# Patient Record
Sex: FEMALE | Race: WHITE | NOT HISPANIC OR LATINO | Employment: OTHER | URBAN - METROPOLITAN AREA
[De-identification: names, ages, dates, MRNs, and addresses within clinical notes are randomized per-mention and may not be internally consistent; named-entity substitution may affect disease eponyms.]

---

## 2017-05-03 ENCOUNTER — HISTORICAL (OUTPATIENT)
Dept: ADMINISTRATIVE | Facility: HOSPITAL | Age: 72
End: 2017-05-03

## 2018-08-28 ENCOUNTER — OFFICE VISIT (OUTPATIENT)
Dept: OBSTETRICS AND GYNECOLOGY | Facility: CLINIC | Age: 73
End: 2018-08-28
Payer: MEDICARE

## 2018-08-28 VITALS — WEIGHT: 105.81 LBS | HEART RATE: 55 BPM | SYSTOLIC BLOOD PRESSURE: 142 MMHG | DIASTOLIC BLOOD PRESSURE: 67 MMHG

## 2018-08-28 DIAGNOSIS — Z12.39 SCREENING FOR MALIGNANT NEOPLASM OF BREAST: ICD-10-CM

## 2018-08-28 DIAGNOSIS — Z01.419 WELL WOMAN EXAM WITH ROUTINE GYNECOLOGICAL EXAM: ICD-10-CM

## 2018-08-28 DIAGNOSIS — R10.2 PELVIC PAIN: ICD-10-CM

## 2018-08-28 DIAGNOSIS — Z00.00 WELL WOMAN EXAM (NO GYNECOLOGICAL EXAM): Primary | ICD-10-CM

## 2018-08-28 DIAGNOSIS — Z01.419 ENCOUNTER FOR WELL WOMAN EXAM WITH ROUTINE GYNECOLOGICAL EXAM: ICD-10-CM

## 2018-08-28 PROCEDURE — 99204 OFFICE O/P NEW MOD 45 MIN: CPT | Mod: PBBFAC,PO | Performed by: OBSTETRICS & GYNECOLOGY

## 2018-08-28 PROCEDURE — G0101 CA SCREEN;PELVIC/BREAST EXAM: HCPCS | Mod: S$PBB,,, | Performed by: OBSTETRICS & GYNECOLOGY

## 2018-08-28 PROCEDURE — 88175 CYTOPATH C/V AUTO FLUID REDO: CPT

## 2018-08-28 PROCEDURE — 87624 HPV HI-RISK TYP POOLED RSLT: CPT

## 2018-08-28 PROCEDURE — G0101 CA SCREEN;PELVIC/BREAST EXAM: HCPCS | Mod: PBBFAC,PO | Performed by: OBSTETRICS & GYNECOLOGY

## 2018-08-28 PROCEDURE — 99999 PR PBB SHADOW E&M-NEW PATIENT-LVL IV: CPT | Mod: PBBFAC,,, | Performed by: OBSTETRICS & GYNECOLOGY

## 2018-08-28 RX ORDER — EZETIMIBE 10 MG/1
10 TABLET ORAL DAILY
COMMUNITY

## 2018-08-28 RX ORDER — ALPRAZOLAM 1 MG/1
1 TABLET ORAL 2 TIMES DAILY
COMMUNITY
End: 2019-10-29

## 2018-08-28 RX ORDER — PANTOPRAZOLE SODIUM 20 MG/1
40 TABLET, DELAYED RELEASE ORAL DAILY
COMMUNITY
End: 2022-12-11 | Stop reason: DRUGHIGH

## 2018-08-28 RX ORDER — TRAZODONE HYDROCHLORIDE 50 MG/1
50 TABLET ORAL NIGHTLY
COMMUNITY
End: 2019-10-29

## 2018-08-28 RX ORDER — LOSARTAN POTASSIUM 25 MG/1
25 TABLET ORAL DAILY
COMMUNITY
End: 2019-10-29

## 2018-08-28 RX ORDER — SERTRALINE HYDROCHLORIDE 50 MG/1
50 TABLET, FILM COATED ORAL DAILY
COMMUNITY
End: 2019-10-29

## 2018-08-28 RX ORDER — ASPIRIN 81 MG/1
81 TABLET ORAL DAILY
COMMUNITY

## 2018-08-28 NOTE — PROGRESS NOTES
Subjective:       Patient ID: Madeline Leavitt is a 72 y.o. female.    Chief Complaint:  Well Woman      History of Present Illness  HPI  Annual Exam-Postmenopausal  Patient presents for annual exam. The patient has no complaints today. The patient is not sexually active. GYN screening history: no prior history of gyn screening tests. The patient is not taking hormone replacement therapy. Patient denies post-menopausal vaginal bleeding. The patient wears seatbelts: yes. The patient participates in regular exercise: not asked. Has the patient ever been transfused or tattooed?: not asked. The patient reports that there is not domestic violence in her life.    GYN & OB History  No LMP recorded. Patient is postmenopausal. Menopause age 46  Date of Last Pap: No result found    OB History    Para Term  AB Living   3 3 3         SAB TAB Ectopic Multiple Live Births                  # Outcome Date GA Lbr Jose/2nd Weight Sex Delivery Anes PTL Lv   3 Term      Vag-Spont      2 Term      Vag-Spont      1 Term      Vag-Spont             Review of Systems  Review of Systems   Constitutional: Negative for activity change, appetite change, chills, diaphoresis, fatigue, fever and unexpected weight change.   HENT: Negative for mouth sores and tinnitus.    Eyes: Negative for discharge and visual disturbance.   Respiratory: Negative for cough, shortness of breath and wheezing.    Cardiovascular: Negative for chest pain, palpitations and leg swelling.   Gastrointestinal: Positive for constipation. Negative for abdominal pain, bloating, blood in stool, diarrhea, nausea and vomiting.   Endocrine: Negative for diabetes, hair loss, hot flashes, hyperthyroidism and hypothyroidism.   Genitourinary: Positive for urinary incontinence. Negative for decreased libido, dyspareunia, dysuria, flank pain, frequency, genital sores, hematuria, menorrhagia, menstrual problem, pelvic pain, urgency, vaginal bleeding, vaginal discharge, vaginal  pain, dysmenorrhea, postcoital bleeding, postmenopausal bleeding and vaginal odor.   Musculoskeletal: Negative for back pain, joint swelling and myalgias.   Skin:  Negative for rash, no acne and hair changes.   Neurological: Negative for seizures, syncope, numbness and headaches.   Hematological: Negative for adenopathy. Does not bruise/bleed easily.   Psychiatric/Behavioral: Negative for depression and sleep disturbance. The patient is nervous/anxious.    Breast: Negative for breast mass, breast pain, nipple discharge and skin changes          Objective:    Physical Exam:   Constitutional: She is oriented to person, place, and time. She appears well-developed and well-nourished. No distress.    HENT:   Head: Normocephalic.    Eyes: Pupils are equal, round, and reactive to light.    Neck: Normal range of motion.    Cardiovascular: Normal rate.     Pulmonary/Chest: Effort normal.   Breasts: implants noted bilaterally. No nipple discharge        Abdominal: Soft. She exhibits no distension. There is tenderness.     Genitourinary: Uterus normal. No vaginal discharge found.   Genitourinary Comments: Pap smear done of cervix. Vaginal canal is atrophic appearing. No palpable adnexal masses but patient is tender in LLQ.           Musculoskeletal: Normal range of motion and moves all extremeties.       Neurological: She is alert and oriented to person, place, and time.    Skin: Skin is warm and dry.    Psychiatric: She has a normal mood and affect. Her behavior is normal. Judgment and thought content normal.          Assessment:        1. Well woman exam (no gynecological exam)    2. Well woman exam with routine gynecological exam    3. Screening for malignant neoplasm of breast    4. Pelvic pain    5. Encounter for well woman exam with routine gynecological exam                Plan:      Pelvic ultrasound and mammogram

## 2018-08-31 LAB
HPV HR 12 DNA CVX QL NAA+PROBE: NEGATIVE
HPV16 AG SPEC QL: NEGATIVE
HPV18 DNA SPEC QL NAA+PROBE: NEGATIVE

## 2018-10-04 ENCOUNTER — HOSPITAL ENCOUNTER (OUTPATIENT)
Dept: RADIOLOGY | Facility: CLINIC | Age: 73
Discharge: HOME | End: 2018-10-04
Attending: OBSTETRICS & GYNECOLOGY
Payer: MEDICARE

## 2018-10-04 DIAGNOSIS — Z12.39 SCREENING FOR MALIGNANT NEOPLASM OF BREAST: ICD-10-CM

## 2018-10-04 DIAGNOSIS — R10.2 PELVIC PAIN: ICD-10-CM

## 2018-10-04 PROCEDURE — 77063 BREAST TOMOSYNTHESIS BI: CPT | Mod: TC,PO

## 2018-10-04 PROCEDURE — 76856 US EXAM PELVIC COMPLETE: CPT | Mod: 26,,, | Performed by: RADIOLOGY

## 2018-10-04 PROCEDURE — 77063 BREAST TOMOSYNTHESIS BI: CPT | Mod: 26,,, | Performed by: RADIOLOGY

## 2018-10-04 PROCEDURE — 76830 TRANSVAGINAL US NON-OB: CPT | Mod: 26,,, | Performed by: RADIOLOGY

## 2018-10-04 PROCEDURE — 77067 SCR MAMMO BI INCL CAD: CPT | Mod: 26,,, | Performed by: RADIOLOGY

## 2018-10-04 PROCEDURE — 76856 US EXAM PELVIC COMPLETE: CPT | Mod: TC,PO

## 2018-10-08 ENCOUNTER — TELEPHONE (OUTPATIENT)
Dept: OBSTETRICS AND GYNECOLOGY | Facility: CLINIC | Age: 73
End: 2018-10-08

## 2018-10-08 NOTE — TELEPHONE ENCOUNTER
Called pt regarding normal elham results/ pt did not answer/ and line was busy. No way to leave voicemail/ letter of normal mammo results mailed to pt.

## 2018-10-08 NOTE — TELEPHONE ENCOUNTER
----- Message from Oliver Kennedy MD sent at 10/8/2018  9:38 AM CDT -----  Please notify patient of normal mammogram.

## 2018-11-01 ENCOUNTER — OFFICE VISIT (OUTPATIENT)
Dept: OBSTETRICS AND GYNECOLOGY | Facility: CLINIC | Age: 73
End: 2018-11-01
Payer: MEDICARE

## 2018-11-01 ENCOUNTER — HOSPITAL ENCOUNTER (OUTPATIENT)
Dept: RADIOLOGY | Facility: HOSPITAL | Age: 73
Discharge: HOME | End: 2018-11-01
Attending: OBSTETRICS & GYNECOLOGY
Payer: MEDICARE

## 2018-11-01 VITALS — WEIGHT: 105 LBS

## 2018-11-01 DIAGNOSIS — R19.00 PELVIC MASS IN FEMALE: ICD-10-CM

## 2018-11-01 DIAGNOSIS — N89.8 VAGINAL ODOR: Primary | ICD-10-CM

## 2018-11-01 DIAGNOSIS — N94.89 ADNEXAL MASS: ICD-10-CM

## 2018-11-01 PROCEDURE — 99212 OFFICE O/P EST SF 10 MIN: CPT | Mod: PBBFAC,PO | Performed by: OBSTETRICS & GYNECOLOGY

## 2018-11-01 PROCEDURE — 99999 PR PBB SHADOW E&M-EST. PATIENT-LVL II: CPT | Mod: PBBFAC,,, | Performed by: OBSTETRICS & GYNECOLOGY

## 2018-11-01 PROCEDURE — A9585 GADOBUTROL INJECTION: HCPCS | Performed by: OBSTETRICS & GYNECOLOGY

## 2018-11-01 PROCEDURE — 72197 MRI PELVIS W/O & W/DYE: CPT | Mod: 26,,, | Performed by: RADIOLOGY

## 2018-11-01 PROCEDURE — 99213 OFFICE O/P EST LOW 20 MIN: CPT | Mod: S$PBB,,, | Performed by: OBSTETRICS & GYNECOLOGY

## 2018-11-01 PROCEDURE — 72197 MRI PELVIS W/O & W/DYE: CPT | Mod: TC

## 2018-11-01 PROCEDURE — 87660 TRICHOMONAS VAGIN DIR PROBE: CPT

## 2018-11-01 PROCEDURE — 25500020 PHARM REV CODE 255: Performed by: OBSTETRICS & GYNECOLOGY

## 2018-11-01 RX ORDER — GADOBUTROL 604.72 MG/ML
INJECTION INTRAVENOUS
Status: DISCONTINUED
Start: 2018-11-01 | End: 2018-11-02 | Stop reason: HOSPADM

## 2018-11-01 RX ORDER — GADOBUTROL 604.72 MG/ML
4 INJECTION INTRAVENOUS
Status: COMPLETED | OUTPATIENT
Start: 2018-11-01 | End: 2018-11-01

## 2018-11-01 RX ADMIN — GADOBUTROL 4 ML: 604.72 INJECTION INTRAVENOUS at 07:11

## 2018-11-01 NOTE — PROGRESS NOTES
Subjective:       Patient ID: Madeline Leavitt is a 73 y.o. female.    Chief Complaint:  Results      History of Present Illness  HPI  73 y.o.  in today with her  to discuss pelvic ultrasound. Scan was done due to LLQ discomfort on pelvic exam. There is noted a non vascular 2.3 cm apparently complex cystic mass in right adnexa. Patient also reports vaginal odor today.    GYN & OB History  No LMP recorded. Patient is postmenopausal.   Date of Last Pap: 2018    OB History    Para Term  AB Living   3 3 3         SAB TAB Ectopic Multiple Live Births                  # Outcome Date GA Lbr Jose/2nd Weight Sex Delivery Anes PTL Lv   3 Term      Vag-Spont      2 Term      Vag-Spont      1 Term      Vag-Spont             Review of Systems  Review of Systems   Constitutional: Negative for activity change, appetite change, chills, diaphoresis, fatigue, fever and unexpected weight change.   HENT: Negative for mouth sores and tinnitus.    Eyes: Negative for discharge and visual disturbance.   Respiratory: Negative for cough, shortness of breath and wheezing.    Cardiovascular: Negative for chest pain, palpitations and leg swelling.   Gastrointestinal: Positive for constipation. Negative for abdominal pain, bloating, blood in stool, diarrhea, nausea and vomiting.   Endocrine: Negative for diabetes, hair loss, hot flashes, hyperthyroidism and hypothyroidism.   Genitourinary: Negative for decreased libido, dyspareunia, dysuria, flank pain, frequency, genital sores, hematuria, menorrhagia, menstrual problem, pelvic pain, urgency, vaginal bleeding, vaginal discharge, vaginal pain, dysmenorrhea, urinary incontinence, postcoital bleeding, postmenopausal bleeding and vaginal odor.   Musculoskeletal: Negative for back pain, joint swelling and myalgias.   Skin:  Negative for rash, no acne and hair changes.   Neurological: Negative for seizures, syncope, numbness and headaches.   Hematological: Negative for  adenopathy. Does not bruise/bleed easily.   Psychiatric/Behavioral: Positive for depression. Negative for sleep disturbance. The patient is nervous/anxious.    Breast: Negative for breast mass, breast pain, nipple discharge and skin changes          Objective:    Physical Exam:   Constitutional: She is oriented to person, place, and time. She appears well-developed and well-nourished. No distress.    HENT:   Head: Normocephalic.    Eyes: Pupils are equal, round, and reactive to light.    Neck: Normal range of motion.    Cardiovascular: Normal rate.     Pulmonary/Chest: Effort normal.          Genitourinary: No vaginal discharge found.   Genitourinary Comments: Vaginal cultures done today           Musculoskeletal: Normal range of motion and moves all extremeties.       Neurological: She is alert and oriented to person, place, and time.    Skin: Skin is warm and dry.    Psychiatric: She has a normal mood and affect. Her behavior is normal. Judgment and thought content normal.          Assessment:        1. Vaginal odor    2. Adnexal mass    3. Pelvic mass in female                Plan:      MRI of pelvis. If suspicious for cancer of ovary,then referral to Gyn Oncology

## 2018-11-02 LAB
CANDIDA RRNA VAG QL PROBE: NEGATIVE
G VAGINALIS RRNA GENITAL QL PROBE: NEGATIVE
T VAGINALIS RRNA GENITAL QL PROBE: NEGATIVE

## 2019-09-26 ENCOUNTER — TELEPHONE (OUTPATIENT)
Dept: FAMILY MEDICINE | Facility: CLINIC | Age: 74
End: 2019-09-26

## 2019-09-26 NOTE — TELEPHONE ENCOUNTER
----- Message from Syl Kirby sent at 9/26/2019 11:28 AM CDT -----  Type: Needs Medical Advice    Who Called:  Patient  Best Call Back Number: [phone]  Additional Information: Epic will not allow me to schedule a new patient with Dr. Lora. I can schedule a established patient but not a new patient. I placed unsuccessful call to office and tried to skype. Please call with availability.

## 2019-10-29 ENCOUNTER — DOCUMENTATION ONLY (OUTPATIENT)
Dept: FAMILY MEDICINE | Facility: CLINIC | Age: 74
End: 2019-10-29

## 2019-10-29 ENCOUNTER — OFFICE VISIT (OUTPATIENT)
Dept: FAMILY MEDICINE | Facility: CLINIC | Age: 74
End: 2019-10-29
Payer: MEDICARE

## 2019-10-29 VITALS
OXYGEN SATURATION: 96 % | BODY MASS INDEX: 22.55 KG/M2 | DIASTOLIC BLOOD PRESSURE: 78 MMHG | SYSTOLIC BLOOD PRESSURE: 128 MMHG | RESPIRATION RATE: 16 BRPM | TEMPERATURE: 98 F | HEIGHT: 62 IN | WEIGHT: 122.56 LBS | HEART RATE: 41 BPM

## 2019-10-29 DIAGNOSIS — R00.1 BRADYCARDIA: ICD-10-CM

## 2019-10-29 DIAGNOSIS — F41.9 ANXIETY AND DEPRESSION: Primary | ICD-10-CM

## 2019-10-29 DIAGNOSIS — R68.89 COLD INTOLERANCE: ICD-10-CM

## 2019-10-29 DIAGNOSIS — I48.0 AF (PAROXYSMAL ATRIAL FIBRILLATION): ICD-10-CM

## 2019-10-29 DIAGNOSIS — F32.A ANXIETY AND DEPRESSION: Primary | ICD-10-CM

## 2019-10-29 DIAGNOSIS — I10 HYPERTENSION, ESSENTIAL: ICD-10-CM

## 2019-10-29 DIAGNOSIS — G47.00 INSOMNIA, UNSPECIFIED TYPE: ICD-10-CM

## 2019-10-29 DIAGNOSIS — E78.5 HYPERLIPIDEMIA, UNSPECIFIED HYPERLIPIDEMIA TYPE: ICD-10-CM

## 2019-10-29 PROCEDURE — 99204 PR OFFICE/OUTPT VISIT, NEW, LEVL IV, 45-59 MIN: ICD-10-PCS | Mod: S$GLB,,, | Performed by: INTERNAL MEDICINE

## 2019-10-29 PROCEDURE — 93005 ELECTROCARDIOGRAM TRACING: CPT | Mod: S$GLB,,, | Performed by: INTERNAL MEDICINE

## 2019-10-29 PROCEDURE — 93005 EKG 12-LEAD: ICD-10-PCS | Mod: S$GLB,,, | Performed by: INTERNAL MEDICINE

## 2019-10-29 PROCEDURE — 93010 ELECTROCARDIOGRAM REPORT: CPT | Mod: S$GLB,,, | Performed by: INTERNAL MEDICINE

## 2019-10-29 PROCEDURE — 93010 EKG 12-LEAD: ICD-10-PCS | Mod: S$GLB,,, | Performed by: INTERNAL MEDICINE

## 2019-10-29 PROCEDURE — 99204 OFFICE O/P NEW MOD 45 MIN: CPT | Mod: S$GLB,,, | Performed by: INTERNAL MEDICINE

## 2019-10-29 RX ORDER — ALPRAZOLAM 0.5 MG/1
0.5 TABLET ORAL 3 TIMES DAILY
Qty: 30 TABLET | Refills: 0 | Status: SHIPPED | OUTPATIENT
Start: 2019-10-29 | End: 2019-12-11 | Stop reason: SDUPTHER

## 2019-10-29 RX ORDER — PAROXETINE HYDROCHLORIDE 20 MG/1
20 TABLET, FILM COATED ORAL EVERY MORNING
Qty: 90 TABLET | Refills: 1 | Status: SHIPPED | OUTPATIENT
Start: 2019-10-29 | End: 2023-01-30

## 2019-10-29 RX ORDER — UBIDECARENONE 30 MG
30 CAPSULE ORAL 3 TIMES DAILY
COMMUNITY

## 2019-10-29 RX ORDER — LISINOPRIL 20 MG/1
40 TABLET ORAL DAILY
COMMUNITY
Start: 2019-10-09 | End: 2022-12-11 | Stop reason: DRUGHIGH

## 2019-10-29 RX ORDER — TRAZODONE HYDROCHLORIDE 100 MG/1
100 TABLET ORAL NIGHTLY PRN
Qty: 30 TABLET | Refills: 3 | Status: SHIPPED | OUTPATIENT
Start: 2019-10-29

## 2019-10-29 RX ORDER — FLECAINIDE ACETATE 50 MG/1
1 TABLET ORAL 3 TIMES DAILY
COMMUNITY
Start: 2019-08-14

## 2019-10-29 NOTE — PROGRESS NOTES
Subjective:      3:03 PM     Patient ID: Madeline Leavitt is a 74 y.o. female.    Chief Complaint: Establish Care and Depression    HPI   The    CHIEF COMPLAINT: Depression: yes.  . Anxiety: yes.   HPI:  She has been on Xanax for 10 years.  She has cut back to 1 at bedtime goes the trazodone does not work.     ONSET/TIMIN months    ago.  Similar problems in past: .yes.   . # of episodes  of panic per week      0. .    DURATION:  constant    QUALITY/COURSE:  Worsening     INTENSITY/SEVERITY: .Severity is # 9 (10 point scale)    CONTEXT/WHEN:  Postpartum: no..  Personal loss: no ..  Stress: yes..    MODIFIERS/TREATMENTS: . Taking medications: yes.  Compliance with taking prescribed medications: yes.  alcohol abuse: no ...  Drug abuse: no .  Chronic disease: no.      The following symptoms are positive if BOLD, negative otherwise.        SYMPTOMS/RELATED: Possible medication side effects include:  dry mouth, decreased libido, impotence, GI disturbance, headache, insomnia, weight gain and weight loss.    REVIEW OF SYSTEMS: Sadness . Weakness . Fatigue . Lack_of _enjoyment_in_life . Sleep_disturbances . Anorexia .  Increased_appetite .  Irritability . Crying_spells .  Guilt .  Lost_concentration . Suicidal_ideation .      The patient has a history of atrial fibrillation which is paroxysmal.  A month ago it lasted for 30 sec which was longer than usual.  She is on both flecainide and Eliquis for this.  When she gets the palpitation she gets a tight throat numbness in her left arm mild chest pain.      CHIEF COMPLAINT: Hypertension  HPI:     ONSET:      QUALITY/COURSE:   Unchanged.     INTENSITY/SEVERITY:  Average blood pressure is unknown.      MODIFIERS/TREATMENTS:  Taking medications: yes. .High sodium intake: no. alcohol: no      The following symptoms are positive only if BOLDED, otherwise are negative.      SYMPTOMS/RELATED: Possible medication side effects include:   Depression..  . Cough. . Constipation.     "REVIEW OF SYMPTOMS: . Weight_loss . Weight_gain . Leg_cramps ..    TARGET ORGAN DAMAGE:: angina/ prior myocardial infarction, chronic kidney disease, heart failure, left ventricular hypertrophy, peripheral artery disease, prior coronary revascularization, retinopathy, stroke. transient ischemic attack.      Review of Systems   Constitutional: Positive for fatigue. Negative for fever and unexpected weight change.   HENT: Negative for dental problem, hearing loss, nosebleeds, rhinorrhea, tinnitus, trouble swallowing and voice change.    Eyes: Negative for itching and visual disturbance.   Respiratory: Negative for cough, shortness of breath and wheezing.    Cardiovascular: Negative for chest pain and palpitations.   Gastrointestinal: Negative for abdominal pain, blood in stool, constipation, diarrhea, nausea and vomiting.   Endocrine: Positive for cold intolerance. Negative for heat intolerance, polydipsia and polyphagia.   Genitourinary: Negative for difficulty urinating and dysuria.   Musculoskeletal: Negative for arthralgias.   Allergic/Immunologic: Negative for environmental allergies and immunocompromised state.   Neurological: Negative for dizziness, seizures, weakness, numbness and headaches.   Hematological: Does not bruise/bleed easily.   Psychiatric/Behavioral: Positive for agitation, dysphoric mood and sleep disturbance. Negative for suicidal ideas. The patient is nervous/anxious.          Objective:      Vitals:    10/29/19 1457   BP: 128/78   Pulse: (!) 41   Resp: 16   Temp: 98 °F (36.7 °C)   TempSrc: Oral   SpO2: 96%   Weight: 55.6 kg (122 lb 9.2 oz)   Height: 5' 2" (1.575 m)   PainSc: 0-No pain     Physical Exam   Constitutional: She is oriented to person, place, and time. She appears well-nourished. No distress.   HENT:   Head: Normocephalic and atraumatic.   Right Ear: External ear normal.   Left Ear: External ear normal.   Mouth/Throat: Oropharynx is clear and moist. No oropharyngeal exudate.   Eyes: " Pupils are equal, round, and reactive to light. Conjunctivae and EOM are normal. No scleral icterus.   Neck: Normal range of motion. Neck supple. No thyromegaly present.   Cardiovascular: Normal rate, regular rhythm, normal heart sounds and intact distal pulses. Exam reveals no gallop and no friction rub.   No murmur heard.  Pulmonary/Chest: Effort normal and breath sounds normal. No respiratory distress. She has no wheezes. She has no rales. She exhibits no tenderness.   Abdominal: Soft. Bowel sounds are normal. She exhibits no distension. There is no tenderness.   Musculoskeletal: Normal range of motion. She exhibits no edema or tenderness.   Lymphadenopathy:     She has no cervical adenopathy.   Neurological: She is alert and oriented to person, place, and time. She displays normal reflexes. No cranial nerve deficit. She exhibits normal muscle tone. Coordination normal.   Skin: Skin is warm and dry. No rash noted.   Psychiatric: She has a normal mood and affect. Her behavior is normal. Judgment and thought content normal.   Nursing note and vitals reviewed.      EKG shows sinus bradycardia, possible previous MI  Assessment:       1. Anxiety and depression    2. Hypertension, essential    3. Cold intolerance    4. Hyperlipidemia, unspecified hyperlipidemia type    5. Insomnia, unspecified type    6. Bradycardia          Plan:       Anxiety and depression  -     paroxetine (PAXIL) 20 MG tablet; Take 1 tablet (20 mg total) by mouth every morning.  Dispense: 90 tablet; Refill: 1  -     ALPRAZolam (XANAX) 0.5 MG tablet; Take 1 tablet (0.5 mg total) by mouth 3 (three) times daily.  Dispense: 30 tablet; Refill: 0    Hypertension, essential  -     CBC auto differential; Future; Expected date: 10/29/2019  -     Comprehensive metabolic panel; Future; Expected date: 10/29/2019    Cold intolerance  -     TSH; Future; Expected date: 10/29/2019    Hyperlipidemia, unspecified hyperlipidemia type  -     Lipid panel; Future;  Expected date: 10/29/2019    Insomnia, unspecified type    Bradycardia  -     EKG 12-lead; Future    Other orders  -     traZODone (DESYREL) 100 MG tablet; Take 1 tablet (100 mg total) by mouth nightly as needed for Insomnia. Take 2 hr before bedtime  Dispense: 30 tablet; Refill: 3      Follow up in about 6 weeks (around 12/10/2019).

## 2019-10-29 NOTE — PATIENT INSTRUCTIONS
"Cbtforinsomnia[email or web address] has a website that has a course that teaches you how to sleep.  I highly recommend it.     Get the book "the 36 hr day by Mace      Exercise will help her depression.  Call us if you have suicidal thoughts      Thank you for choosing [facility].     Please fill out the patient experience survey.  "

## 2019-10-29 NOTE — PROGRESS NOTES
Health Maintenance Due   Topic Date Due    Hepatitis C Screening  1945    TETANUS VACCINE  09/11/1964    DEXA SCAN  09/11/1986    Colonoscopy  09/11/1996    Pneumococcal Vaccine (65+ Low/Medium Risk) (1 of 2 - PCV13) 09/11/2011

## 2019-11-01 DIAGNOSIS — Z12.11 COLON CANCER SCREENING: ICD-10-CM

## 2019-11-20 ENCOUNTER — PATIENT OUTREACH (OUTPATIENT)
Dept: ADMINISTRATIVE | Facility: HOSPITAL | Age: 74
End: 2019-11-20

## 2019-11-20 DIAGNOSIS — Z11.59 ENCOUNTER FOR HEPATITIS C SCREENING TEST FOR LOW RISK PATIENT: Primary | ICD-10-CM

## 2019-11-20 DIAGNOSIS — Z78.0 ASYMPTOMATIC MENOPAUSAL STATE: ICD-10-CM

## 2019-11-20 NOTE — LETTER
"November 20, 2019    Madeline Leavitt  [address] [place] [address]             [facility]  [address]  [facility] [address]  Phone: [phone] [facility] is committed to your overall health.  To help you get the most out of each of your visits, we will review your information to make sure you are up to date on all of your recommended tests and/or procedures.      Dr. Juan M Lora MD has found that your chart shows you may be due for a:    COLORECTAL CANCER SCREENING  BONE MINERAL DENSITY SCAN (DEXA)  One-time Hepatitis C Screening lab test(a viral condition that can harm the liver)    Our records show you are due for colon cancer screening.  If you have already had your screening, or you have made an appointment for your screening, congratulations!  You're on the road to good health. If you haven't signed up for a colorectal screening please accept this invitation to be screened.      According to the American Cancer Society, colon cancer is the third most common cancer for people in the [place].  A simple screening test "Fit Kit" - done in your own home - can help find colon cancer at an early stage when it can be treated, even before any signs or symptoms develop. THIS IS A YEARLY TEST.    Testing for blood in your stool (feces or bowel movement) is the first step. If you have an upcoming visit with your Primary Care Physician you can  a Fit Kit during your visit or you can  a Fit Kit at your Primary Care Clinic prior to your visit.    The Fit Kit includes:    · Instructions on how to perform the test  · (1) Sheet of tissue paper  · (1) Small Absorption pad  · (1) Bottle to hold the sample and a small probe to help you take the sample  · (1) Small plastic bio-hazard bag  · (1) Postage-paid return envelope    Please do your test (the instructions will tell you how) and then return your sample in the postage-paid return envelope within 24 hours of collection.     If " "your test results are negative, you won't need testing again for another year.  If results show you need more testing, we will call you with next steps.    Regular colorectal cancer screening is one of the most powerful weapons for preventing colon cancer.  The website https://www.cancer.org/cancer/colon-rectal-cancer.html can answer many of your questions about this cancer and its prevention.  Just search for "colorectal cancer".         If you have had any of the above done at another facility, please bring the records or information with you so that your record at [facility] will be complete.  If you would like to schedule any of these, please contact the clinic at [phone].    If you are currently taking medication, please bring it with you to your appointment for review.    Also, if you have any type of Advanced Directives, please bring them with you to your office visit so we may scan them into your chart.    Thank You,    Your [facility] Team,  MD Katt Ospina LPN Clinical Care Coordinator  [facility] [address] [place] [address]  Phone [phone]  Fax [phone]       "

## 2019-11-20 NOTE — PROGRESS NOTES
Health Maintenance Due   Topic Date Due    Hepatitis C Screening  1945    TETANUS VACCINE  09/11/1964    DEXA SCAN  09/11/1986    Shingles Vaccine (1 of 2) 09/11/1996    Colonoscopy  09/11/1996    Pneumococcal Vaccine (65+ Low/Medium Risk) (1 of 2 - PCV13) 09/11/2011    Influenza Vaccine (1) 09/01/2019

## 2019-12-03 ENCOUNTER — TELEPHONE (OUTPATIENT)
Dept: FAMILY MEDICINE | Facility: CLINIC | Age: 74
End: 2019-12-03

## 2019-12-03 NOTE — TELEPHONE ENCOUNTER
----- Message from Reece Hoyt sent at 12/3/2019  3:40 PM CST -----  Contact: Pt  Pt called and would like to have someone from your office call her back    This is regarding rescheduling a blood test    Pt can be reached at [phone]

## 2019-12-11 ENCOUNTER — OFFICE VISIT (OUTPATIENT)
Dept: FAMILY MEDICINE | Facility: CLINIC | Age: 74
End: 2019-12-11
Payer: MEDICARE

## 2019-12-11 VITALS
BODY MASS INDEX: 23 KG/M2 | RESPIRATION RATE: 16 BRPM | WEIGHT: 125 LBS | SYSTOLIC BLOOD PRESSURE: 126 MMHG | HEIGHT: 62 IN | DIASTOLIC BLOOD PRESSURE: 80 MMHG | HEART RATE: 45 BPM | OXYGEN SATURATION: 97 % | TEMPERATURE: 98 F

## 2019-12-11 DIAGNOSIS — Z23 NEED FOR VACCINATION WITH 13-POLYVALENT PNEUMOCOCCAL CONJUGATE VACCINE: ICD-10-CM

## 2019-12-11 DIAGNOSIS — G47.00 INSOMNIA, UNSPECIFIED TYPE: ICD-10-CM

## 2019-12-11 DIAGNOSIS — F41.9 ANXIETY AND DEPRESSION: Primary | ICD-10-CM

## 2019-12-11 DIAGNOSIS — F32.A ANXIETY AND DEPRESSION: Primary | ICD-10-CM

## 2019-12-11 DIAGNOSIS — N17.9 ACUTE KIDNEY INJURY: ICD-10-CM

## 2019-12-11 DIAGNOSIS — R00.1 BRADYCARDIA: ICD-10-CM

## 2019-12-11 PROCEDURE — 1159F MED LIST DOCD IN RCRD: CPT | Mod: S$GLB,,, | Performed by: INTERNAL MEDICINE

## 2019-12-11 PROCEDURE — 1159F PR MEDICATION LIST DOCUMENTED IN MEDICAL RECORD: ICD-10-PCS | Mod: S$GLB,,, | Performed by: INTERNAL MEDICINE

## 2019-12-11 PROCEDURE — 1126F PR PAIN SEVERITY QUANTIFIED, NO PAIN PRESENT: ICD-10-PCS | Mod: S$GLB,,, | Performed by: INTERNAL MEDICINE

## 2019-12-11 PROCEDURE — 90662 FLU VACCINE - HIGH DOSE (65+) PRESERVATIVE FREE IM: ICD-10-PCS | Mod: S$GLB,,, | Performed by: INTERNAL MEDICINE

## 2019-12-11 PROCEDURE — 1126F AMNT PAIN NOTED NONE PRSNT: CPT | Mod: S$GLB,,, | Performed by: INTERNAL MEDICINE

## 2019-12-11 PROCEDURE — 90662 IIV NO PRSV INCREASED AG IM: CPT | Mod: S$GLB,,, | Performed by: INTERNAL MEDICINE

## 2019-12-11 PROCEDURE — 90670 PCV13 VACCINE IM: CPT | Mod: S$GLB,,, | Performed by: INTERNAL MEDICINE

## 2019-12-11 PROCEDURE — G0008 ADMIN INFLUENZA VIRUS VAC: HCPCS | Mod: S$GLB,,, | Performed by: INTERNAL MEDICINE

## 2019-12-11 PROCEDURE — G0009 PNEUMOCOCCAL CONJUGATE VACCINE 13-VALENT LESS THAN 5YO & GREATER THAN: ICD-10-PCS | Mod: S$GLB,,, | Performed by: INTERNAL MEDICINE

## 2019-12-11 PROCEDURE — 99214 OFFICE O/P EST MOD 30 MIN: CPT | Mod: 25,S$GLB,, | Performed by: INTERNAL MEDICINE

## 2019-12-11 PROCEDURE — G0008 FLU VACCINE - HIGH DOSE (65+) PRESERVATIVE FREE IM: ICD-10-PCS | Mod: S$GLB,,, | Performed by: INTERNAL MEDICINE

## 2019-12-11 PROCEDURE — 99214 PR OFFICE/OUTPT VISIT, EST, LEVL IV, 30-39 MIN: ICD-10-PCS | Mod: 25,S$GLB,, | Performed by: INTERNAL MEDICINE

## 2019-12-11 PROCEDURE — 90670 PNEUMOCOCCAL CONJUGATE VACCINE 13-VALENT LESS THAN 5YO & GREATER THAN: ICD-10-PCS | Mod: S$GLB,,, | Performed by: INTERNAL MEDICINE

## 2019-12-11 PROCEDURE — G0009 ADMIN PNEUMOCOCCAL VACCINE: HCPCS | Mod: S$GLB,,, | Performed by: INTERNAL MEDICINE

## 2019-12-11 RX ORDER — ALPRAZOLAM 0.5 MG/1
0.5 TABLET ORAL 2 TIMES DAILY PRN
Qty: 20 TABLET | Refills: 2 | Status: SHIPPED | OUTPATIENT
Start: 2019-12-11 | End: 2019-12-31

## 2019-12-11 NOTE — PATIENT INSTRUCTIONS
Take the trazodone 10 hr before you plan to get up    Take the blood work after meal with lots of fluid intake.    The let us know if you're having dizzy problems.    Thank you for choosing [facility].     Please fill out the patient experience survey.

## 2019-12-11 NOTE — PROGRESS NOTES
Subjective:      4:49 PM     Patient ID: Madeline Leavitt is a 74 y.o. female.    Chief Complaint: Anxiety (discuss meds,no refills needed)    HPI           CHIEF COMPLAINT: Hyperlipidemia. cholesterol screening: no.   HPI:     ONSET:    MODIFIERS/TREATMENTS: . Taking medications: yes. . Non-compliance with following diet: no. .     SYMPTOMS/RELATED:Possible medication side effects include:   Myalgia: no.  .     REVIEW OF SYMPTOMS: past weights:   Wt Readings from Last 1 Encounters:   19 1618 56.7 kg (125 lb)                                                     Last lipids: total No results found for: CHOL                                                                  HDL No results found for: HDL                                                                  LDL No results found for: LDLCALC                                                                  TRIG No results found for: TRIG                                                                          CHIEF COMPLAINT: Depression: no.  . Anxiety: yes.   HPI:  Sick relative  and now there is less stress.  Mainly using the Xanax at night.    ONSET/TIMING:  Years.  ago.  Similar problems in past: .yes.   . # of episodes  of panic per week      0. .    DURATION:  constant    QUALITY/COURSE:  Better    INTENSITY/SEVERITY: .Severity is # 3 (10 point scale)    CONTEXT/WHEN:  Postpartum: no..  Personal loss: no ..  Stress: yes..    MODIFIERS/TREATMENTS: . Taking medications: yes.  Compliance with taking prescribed medications: yes.  alcohol abuse: no ...  Drug abuse: no .  Chronic disease: no.      The following symptoms are positive if BOLD, negative otherwise.        SYMPTOMS/RELATED: Possible medication side effects include:  dry mouth, constipation, diarrhea, decreased libido, impotence, GI disturbance, headache, insomnia, weight gain and weight loss.    REVIEW OF SYSTEMS: Sadness . Weakness . Fatigue . Lack_of _enjoyment_in_life . Sleep_disturbances  "was tired in morning when she took a full trazodone pill.  She start taking half. . Anorexia .  Increased_appetite .  Irritability . Crying_spells .  Guilt .  Lost_concentration . Racing thoughts.  Suicidal_ideation .   During panic attacks:  Chest_pain  . Shortness_of_breath  . dizziness . tingling . palpitations .      The patient has had several low pulse rates including today 45.  She is being followed by Dr. Martinez for paroxysmal atrial fibrillation and is not taking any drugs that is lower heart rate..  She denies any dizziness      Review of Systems      Objective:      Vitals:    12/11/19 1618   BP: 126/80   Pulse: (!) 45   Resp: 16   Temp: 97.5 °F (36.4 °C)   TempSrc: Oral   SpO2: 97%   Weight: 56.7 kg (125 lb)   Height: 5' 2" (1.575 m)   PainSc: 0-No pain     Physical Exam   Constitutional: She appears well-developed and well-nourished.   Cardiovascular: Normal rate, regular rhythm and normal heart sounds.   Pulmonary/Chest: Effort normal and breath sounds normal.   Abdominal: Soft. There is no tenderness.   Neurological: She is alert.   Psychiatric: She has a normal mood and affect. Her behavior is normal. Thought content normal.   Nursing note and vitals reviewed.      non HDL cholesterol 122  Assessment:       1. Anxiety and depression    2. Insomnia, unspecified type    3. Acute kidney injury    4. Bradycardia    5. Need for vaccination with 13-polyvalent pneumococcal conjugate vaccine          Plan:   Could consider aminophylline if she gets symptoms dizziness due to the bradycardia    Anxiety and depression  -     ALPRAZolam (XANAX) 0.5 MG tablet; Take 1 tablet (0.5 mg total) by mouth 2 (two) times daily as needed for Anxiety.  Dispense: 20 tablet; Refill: 2    Insomnia, unspecified type    Acute kidney injury  -     Comprehensive metabolic panel; Future; Expected date: 12/11/2019    Bradycardia    Need for vaccination with 13-polyvalent pneumococcal conjugate vaccine  -     Pneumococcal Conjugate " Vaccine (13 Valent) (IM)    Other orders  -     Influenza - High Dose (65+) (PF) (IM)      Follow up in about 3 months (around 3/11/2020).

## 2019-12-12 NOTE — PROGRESS NOTES
Administered flu and prevnar vaccine IM. Patient tolerated well. No bleeding at insertion site noted. Pain scale 0/10 with injection. 2 patient identifiers used (name, ), aseptic technique maintained.

## 2020-03-02 ENCOUNTER — PATIENT OUTREACH (OUTPATIENT)
Dept: ADMINISTRATIVE | Facility: HOSPITAL | Age: 75
End: 2020-03-02

## 2020-03-02 NOTE — PROGRESS NOTES
Chart review completed 03/02/2020.  Care Everywhere updates requested and reviewed.  Immunizations reconciled. Media reviewed.     Letter mailed.

## 2020-03-02 NOTE — LETTER
March 2, 2020    Madeline Leavitt  [address] [place] [address]     [facility]  [address]  [facility] [address]  Phone: [phone] Madeline Leavitt  [address] [place] [address]    Dear, Madeline Leavitt,    [facility] is committed to your overall health.  To help you get the most out of each of your visits, we will review your information to make sure you are up to date on all of your recommended tests and/or procedures.  Juan M Lora MD  has found that your chart shows you may be due for the following:    Health Maintenance Due   Topic    Hepatitis C Screening     TETANUS VACCINE     DEXA SCAN     Colonoscopy      If you have had any of the above done at another facility, please bring the records with you or Fax them to [phone] so that your record at [facility] will be complete. If you have not had any of these tests or procedures done recently and would like to complete this testing ,  please call [phone] or send a message through your [facility] portal to your provider's office.     If you have an upcoming scheduled appointment for the above test and/or procedures, please disregard this letter.    If you are currently taking medication, please bring it with you to your appointment for review.    Thank you for letting us care for you,    Bebeto Loo, Care Coordinator  [facility]  Phone: [phone]  Fax: [phone]

## 2020-04-14 DIAGNOSIS — F41.9 ANXIETY AND DEPRESSION: ICD-10-CM

## 2020-04-14 DIAGNOSIS — F32.A ANXIETY AND DEPRESSION: ICD-10-CM

## 2020-04-14 RX ORDER — ALPRAZOLAM 0.5 MG/1
0.5 TABLET ORAL 2 TIMES DAILY PRN
Qty: 20 TABLET | Refills: 2 | OUTPATIENT
Start: 2020-04-14 | End: 2020-05-04

## 2020-05-08 ENCOUNTER — TELEPHONE (OUTPATIENT)
Dept: FAMILY MEDICINE | Facility: CLINIC | Age: 75
End: 2020-05-08

## 2020-05-08 NOTE — TELEPHONE ENCOUNTER
Called patient to schedule appt for med refill. Patient stated that she did not need refills and refused appt.

## 2020-07-28 ENCOUNTER — PATIENT OUTREACH (OUTPATIENT)
Dept: ADMINISTRATIVE | Facility: HOSPITAL | Age: 75
End: 2020-07-28

## 2020-07-28 NOTE — LETTER
July 28, 2020    Madeline Leavitt  [address] [place] [address]             [facility]  [address]  [facility] [address]  Phone: [phone]            July 28, 2020     Madeline Leavitt  [address] [place] [address]       Dear Madeline:    Thank you for choosing to receive your care at [facility]. Your [facility] wants to make sure we help you prevent illness and make the healthiest choices possible.    Our records show you are eligible and due for a Colorectal Cancer Screening.  To assist you in completing this important screening  you were mailed a FitKit.   ·      If you have completed and returned the Kit, thank you.  ·      If you did not receive a Kit please let us know.  ·      If you have received the Kit but have not yet completed it please do so at your earliest convenience. Please remember to document the collection date prior to mailing.     Thanks again for choosing [facility]. We wish you continued good health!     Your [facility]    Carina Harry, Care Coordinator  [facility]  Phone: [phone]  Fax: [phone]

## 2020-11-04 ENCOUNTER — HOSPITAL ENCOUNTER (OUTPATIENT)
Facility: HOSPITAL | Age: 75
Discharge: HOME | End: 2020-11-05
Attending: EMERGENCY MEDICINE | Admitting: INTERNAL MEDICINE
Payer: MEDICARE

## 2020-11-04 DIAGNOSIS — R07.9 CHEST PAIN: Primary | ICD-10-CM

## 2020-11-04 DIAGNOSIS — I49.9 ARRHYTHMIA: ICD-10-CM

## 2020-11-04 PROBLEM — I48.91 ATRIAL FIBRILLATION: Status: ACTIVE | Noted: 2019-10-29

## 2020-11-04 PROBLEM — R94.4 DECREASED GFR: Status: ACTIVE | Noted: 2020-11-04

## 2020-11-04 LAB
ALBUMIN SERPL BCP-MCNC: 4.5 G/DL (ref 3.5–5.2)
ALP SERPL-CCNC: 44 U/L (ref 55–135)
ALT SERPL W/O P-5'-P-CCNC: 16 U/L (ref 10–44)
ANION GAP SERPL CALC-SCNC: 12 MMOL/L (ref 8–16)
AST SERPL-CCNC: 23 U/L (ref 10–40)
BASOPHILS # BLD AUTO: 0.07 K/UL (ref 0–0.2)
BASOPHILS NFR BLD: 0.7 % (ref 0–1.9)
BILIRUB SERPL-MCNC: 1 MG/DL (ref 0.1–1)
BNP SERPL-MCNC: 66 PG/ML (ref 0–99)
BUN SERPL-MCNC: 22 MG/DL (ref 8–23)
CALCIUM SERPL-MCNC: 9.7 MG/DL (ref 8.7–10.5)
CHLORIDE SERPL-SCNC: 100 MMOL/L (ref 95–110)
CO2 SERPL-SCNC: 27 MMOL/L (ref 23–29)
CREAT SERPL-MCNC: 1.7 MG/DL (ref 0.5–1.4)
DIFFERENTIAL METHOD: ABNORMAL
EOSINOPHIL # BLD AUTO: 0.2 K/UL (ref 0–0.5)
EOSINOPHIL NFR BLD: 1.6 % (ref 0–8)
ERYTHROCYTE [DISTWIDTH] IN BLOOD BY AUTOMATED COUNT: 14.4 % (ref 11.5–14.5)
EST. GFR  (AFRICAN AMERICAN): 33.8 ML/MIN/1.73 M^2
EST. GFR  (NON AFRICAN AMERICAN): 29.3 ML/MIN/1.73 M^2
GLUCOSE SERPL-MCNC: 77 MG/DL (ref 70–110)
HCT VFR BLD AUTO: 44.3 % (ref 37–48.5)
HGB BLD-MCNC: 14.5 G/DL (ref 12–16)
IMM GRANULOCYTES # BLD AUTO: 0.03 K/UL (ref 0–0.04)
IMM GRANULOCYTES NFR BLD AUTO: 0.3 % (ref 0–0.5)
LYMPHOCYTES # BLD AUTO: 5 K/UL (ref 1–4.8)
LYMPHOCYTES NFR BLD: 50 % (ref 18–48)
MAGNESIUM SERPL-MCNC: 1.9 MG/DL (ref 1.6–2.6)
MCH RBC QN AUTO: 30 PG (ref 27–31)
MCHC RBC AUTO-ENTMCNC: 32.7 G/DL (ref 32–36)
MCV RBC AUTO: 92 FL (ref 82–98)
MONOCYTES # BLD AUTO: 0.8 K/UL (ref 0.3–1)
MONOCYTES NFR BLD: 7.4 % (ref 4–15)
NEUTROPHILS # BLD AUTO: 4 K/UL (ref 1.8–7.7)
NEUTROPHILS NFR BLD: 40 % (ref 38–73)
NRBC BLD-RTO: 0 /100 WBC
PLATELET # BLD AUTO: 270 K/UL (ref 150–350)
PMV BLD AUTO: 10.8 FL (ref 9.2–12.9)
POTASSIUM SERPL-SCNC: 4 MMOL/L (ref 3.5–5.1)
PROT SERPL-MCNC: 7.3 G/DL (ref 6–8.4)
RBC # BLD AUTO: 4.84 M/UL (ref 4–5.4)
SARS-COV-2 RDRP RESP QL NAA+PROBE: NEGATIVE
SODIUM SERPL-SCNC: 139 MMOL/L (ref 136–145)
TROPONIN I SERPL DL<=0.01 NG/ML-MCNC: <0.03 NG/ML
TROPONIN I SERPL DL<=0.01 NG/ML-MCNC: <0.03 NG/ML
WBC # BLD AUTO: 10.08 K/UL (ref 3.9–12.7)

## 2020-11-04 PROCEDURE — 99285 EMERGENCY DEPT VISIT HI MDM: CPT | Mod: 25,CS

## 2020-11-04 PROCEDURE — 25000003 PHARM REV CODE 250: Performed by: EMERGENCY MEDICINE

## 2020-11-04 PROCEDURE — 93010 EKG 12-LEAD: ICD-10-PCS | Mod: ,,, | Performed by: INTERNAL MEDICINE

## 2020-11-04 PROCEDURE — 93005 ELECTROCARDIOGRAM TRACING: CPT | Performed by: INTERNAL MEDICINE

## 2020-11-04 PROCEDURE — 83880 ASSAY OF NATRIURETIC PEPTIDE: CPT

## 2020-11-04 PROCEDURE — G0378 HOSPITAL OBSERVATION PER HR: HCPCS | Mod: CS

## 2020-11-04 PROCEDURE — U0002 COVID-19 LAB TEST NON-CDC: HCPCS

## 2020-11-04 PROCEDURE — 93010 ELECTROCARDIOGRAM REPORT: CPT | Mod: ,,, | Performed by: INTERNAL MEDICINE

## 2020-11-04 PROCEDURE — 85025 COMPLETE CBC W/AUTO DIFF WBC: CPT

## 2020-11-04 PROCEDURE — 83735 ASSAY OF MAGNESIUM: CPT

## 2020-11-04 PROCEDURE — 25000003 PHARM REV CODE 250: Performed by: NURSE PRACTITIONER

## 2020-11-04 PROCEDURE — G0378 HOSPITAL OBSERVATION PER HR: HCPCS

## 2020-11-04 PROCEDURE — 80053 COMPREHEN METABOLIC PANEL: CPT

## 2020-11-04 PROCEDURE — 36415 COLL VENOUS BLD VENIPUNCTURE: CPT

## 2020-11-04 PROCEDURE — 84484 ASSAY OF TROPONIN QUANT: CPT

## 2020-11-04 PROCEDURE — 84484 ASSAY OF TROPONIN QUANT: CPT | Mod: 91

## 2020-11-04 RX ORDER — SODIUM CHLORIDE 9 MG/ML
INJECTION, SOLUTION INTRAVENOUS CONTINUOUS
Status: DISCONTINUED | OUTPATIENT
Start: 2020-11-04 | End: 2020-11-05 | Stop reason: HOSPADM

## 2020-11-04 RX ORDER — NITROGLYCERIN 0.4 MG/1
0.4 TABLET SUBLINGUAL EVERY 5 MIN PRN
Status: DISCONTINUED | OUTPATIENT
Start: 2020-11-04 | End: 2020-11-05 | Stop reason: HOSPADM

## 2020-11-04 RX ORDER — ALPRAZOLAM 0.5 MG/1
0.5 TABLET ORAL 2 TIMES DAILY PRN
Status: DISCONTINUED | OUTPATIENT
Start: 2020-11-04 | End: 2020-11-05 | Stop reason: HOSPADM

## 2020-11-04 RX ORDER — BISACODYL 10 MG
10 SUPPOSITORY, RECTAL RECTAL DAILY PRN
Status: DISCONTINUED | OUTPATIENT
Start: 2020-11-04 | End: 2020-11-05 | Stop reason: HOSPADM

## 2020-11-04 RX ORDER — ASPIRIN 81 MG/1
81 TABLET ORAL DAILY
Status: DISCONTINUED | OUTPATIENT
Start: 2020-11-05 | End: 2020-11-04

## 2020-11-04 RX ORDER — TALC
6 POWDER (GRAM) TOPICAL NIGHTLY PRN
Status: DISCONTINUED | OUTPATIENT
Start: 2020-11-04 | End: 2020-11-05 | Stop reason: HOSPADM

## 2020-11-04 RX ORDER — PANTOPRAZOLE SODIUM 40 MG/1
40 TABLET, DELAYED RELEASE ORAL DAILY
Status: DISCONTINUED | OUTPATIENT
Start: 2020-11-05 | End: 2020-11-05 | Stop reason: HOSPADM

## 2020-11-04 RX ORDER — TRAZODONE HYDROCHLORIDE 50 MG/1
100 TABLET ORAL NIGHTLY PRN
Status: DISCONTINUED | OUTPATIENT
Start: 2020-11-04 | End: 2020-11-05 | Stop reason: HOSPADM

## 2020-11-04 RX ORDER — SODIUM CHLORIDE 0.9 % (FLUSH) 0.9 %
10 SYRINGE (ML) INJECTION
Status: DISCONTINUED | OUTPATIENT
Start: 2020-11-04 | End: 2020-11-05 | Stop reason: HOSPADM

## 2020-11-04 RX ORDER — ACETAMINOPHEN 325 MG/1
650 TABLET ORAL EVERY 4 HOURS PRN
Status: DISCONTINUED | OUTPATIENT
Start: 2020-11-04 | End: 2020-11-05 | Stop reason: HOSPADM

## 2020-11-04 RX ORDER — NAPROXEN SODIUM 220 MG/1
81 TABLET, FILM COATED ORAL DAILY
Status: DISCONTINUED | OUTPATIENT
Start: 2020-11-04 | End: 2020-11-05 | Stop reason: HOSPADM

## 2020-11-04 RX ORDER — ASPIRIN 325 MG
325 TABLET ORAL
Status: COMPLETED | OUTPATIENT
Start: 2020-11-04 | End: 2020-11-04

## 2020-11-04 RX ORDER — PAROXETINE HYDROCHLORIDE 20 MG/1
20 TABLET, FILM COATED ORAL EVERY MORNING
Status: DISCONTINUED | OUTPATIENT
Start: 2020-11-05 | End: 2020-11-05 | Stop reason: HOSPADM

## 2020-11-04 RX ORDER — ONDANSETRON 2 MG/ML
4 INJECTION INTRAMUSCULAR; INTRAVENOUS EVERY 8 HOURS PRN
Status: DISCONTINUED | OUTPATIENT
Start: 2020-11-04 | End: 2020-11-05 | Stop reason: HOSPADM

## 2020-11-04 RX ADMIN — SODIUM CHLORIDE: 0.9 INJECTION, SOLUTION INTRAVENOUS at 10:11

## 2020-11-04 RX ADMIN — ASPIRIN 81 MG: 81 TABLET, CHEWABLE ORAL at 10:11

## 2020-11-04 RX ADMIN — ALPRAZOLAM 0.5 MG: 0.5 TABLET ORAL at 10:11

## 2020-11-04 RX ADMIN — APIXABAN 5 MG: 5 TABLET, FILM COATED ORAL at 10:11

## 2020-11-04 RX ADMIN — ASPIRIN 325 MG ORAL TABLET 325 MG: 325 PILL ORAL at 06:11

## 2020-11-05 VITALS
BODY MASS INDEX: 23.17 KG/M2 | HEIGHT: 62 IN | OXYGEN SATURATION: 96 % | DIASTOLIC BLOOD PRESSURE: 67 MMHG | WEIGHT: 125.88 LBS | RESPIRATION RATE: 17 BRPM | SYSTOLIC BLOOD PRESSURE: 150 MMHG | TEMPERATURE: 98 F | HEART RATE: 54 BPM

## 2020-11-05 PROBLEM — R07.9 CHEST PAIN: Status: RESOLVED | Noted: 2020-11-04 | Resolved: 2020-11-05

## 2020-11-05 LAB
ANION GAP SERPL CALC-SCNC: 9 MMOL/L (ref 8–16)
ANION GAP SERPL CALC-SCNC: 9 MMOL/L (ref 8–16)
BASOPHILS # BLD AUTO: 0.06 K/UL (ref 0–0.2)
BASOPHILS # BLD AUTO: 0.09 K/UL (ref 0–0.2)
BASOPHILS NFR BLD: 0.6 % (ref 0–1.9)
BASOPHILS NFR BLD: 0.8 % (ref 0–1.9)
BUN SERPL-MCNC: 23 MG/DL (ref 8–23)
BUN SERPL-MCNC: 23 MG/DL (ref 8–23)
CALCIUM SERPL-MCNC: 8.9 MG/DL (ref 8.7–10.5)
CALCIUM SERPL-MCNC: 9.1 MG/DL (ref 8.7–10.5)
CHLORIDE SERPL-SCNC: 101 MMOL/L (ref 95–110)
CHLORIDE SERPL-SCNC: 103 MMOL/L (ref 95–110)
CO2 SERPL-SCNC: 25 MMOL/L (ref 23–29)
CO2 SERPL-SCNC: 28 MMOL/L (ref 23–29)
CREAT SERPL-MCNC: 1.2 MG/DL (ref 0.5–1.4)
CREAT SERPL-MCNC: 1.3 MG/DL (ref 0.5–1.4)
DIFFERENTIAL METHOD: ABNORMAL
DIFFERENTIAL METHOD: ABNORMAL
EOSINOPHIL # BLD AUTO: 0.2 K/UL (ref 0–0.5)
EOSINOPHIL # BLD AUTO: 0.3 K/UL (ref 0–0.5)
EOSINOPHIL NFR BLD: 2 % (ref 0–8)
EOSINOPHIL NFR BLD: 2.6 % (ref 0–8)
ERYTHROCYTE [DISTWIDTH] IN BLOOD BY AUTOMATED COUNT: 14.4 % (ref 11.5–14.5)
ERYTHROCYTE [DISTWIDTH] IN BLOOD BY AUTOMATED COUNT: 14.4 % (ref 11.5–14.5)
EST. GFR  (AFRICAN AMERICAN): 46.7 ML/MIN/1.73 M^2
EST. GFR  (AFRICAN AMERICAN): 51.4 ML/MIN/1.73 M^2
EST. GFR  (NON AFRICAN AMERICAN): 40.5 ML/MIN/1.73 M^2
EST. GFR  (NON AFRICAN AMERICAN): 44.6 ML/MIN/1.73 M^2
GLUCOSE SERPL-MCNC: 94 MG/DL (ref 70–110)
GLUCOSE SERPL-MCNC: 97 MG/DL (ref 70–110)
HCT VFR BLD AUTO: 41.4 % (ref 37–48.5)
HCT VFR BLD AUTO: 43.2 % (ref 37–48.5)
HGB BLD-MCNC: 13.3 G/DL (ref 12–16)
HGB BLD-MCNC: 13.6 G/DL (ref 12–16)
IMM GRANULOCYTES # BLD AUTO: 0.03 K/UL (ref 0–0.04)
IMM GRANULOCYTES # BLD AUTO: 0.03 K/UL (ref 0–0.04)
IMM GRANULOCYTES NFR BLD AUTO: 0.3 % (ref 0–0.5)
IMM GRANULOCYTES NFR BLD AUTO: 0.3 % (ref 0–0.5)
LYMPHOCYTES # BLD AUTO: 6.2 K/UL (ref 1–4.8)
LYMPHOCYTES # BLD AUTO: 7.2 K/UL (ref 1–4.8)
LYMPHOCYTES NFR BLD: 61.6 % (ref 18–48)
LYMPHOCYTES NFR BLD: 63.3 % (ref 18–48)
MCH RBC QN AUTO: 28.7 PG (ref 27–31)
MCH RBC QN AUTO: 29 PG (ref 27–31)
MCHC RBC AUTO-ENTMCNC: 31.5 G/DL (ref 32–36)
MCHC RBC AUTO-ENTMCNC: 32.1 G/DL (ref 32–36)
MCV RBC AUTO: 90 FL (ref 82–98)
MCV RBC AUTO: 91 FL (ref 82–98)
MONOCYTES # BLD AUTO: 0.6 K/UL (ref 0.3–1)
MONOCYTES # BLD AUTO: 0.6 K/UL (ref 0.3–1)
MONOCYTES NFR BLD: 5.6 % (ref 4–15)
MONOCYTES NFR BLD: 6.2 % (ref 4–15)
NEUTROPHILS # BLD AUTO: 2.9 K/UL (ref 1.8–7.7)
NEUTROPHILS # BLD AUTO: 3.2 K/UL (ref 1.8–7.7)
NEUTROPHILS NFR BLD: 28 % (ref 38–73)
NEUTROPHILS NFR BLD: 28.7 % (ref 38–73)
NRBC BLD-RTO: 0 /100 WBC
NRBC BLD-RTO: 0 /100 WBC
PLATELET # BLD AUTO: 241 K/UL (ref 150–350)
PLATELET # BLD AUTO: 253 K/UL (ref 150–350)
PMV BLD AUTO: 10.8 FL (ref 9.2–12.9)
PMV BLD AUTO: 10.9 FL (ref 9.2–12.9)
POTASSIUM SERPL-SCNC: 3.8 MMOL/L (ref 3.5–5.1)
POTASSIUM SERPL-SCNC: 4 MMOL/L (ref 3.5–5.1)
RBC # BLD AUTO: 4.58 M/UL (ref 4–5.4)
RBC # BLD AUTO: 4.74 M/UL (ref 4–5.4)
SODIUM SERPL-SCNC: 137 MMOL/L (ref 136–145)
SODIUM SERPL-SCNC: 138 MMOL/L (ref 136–145)
TROPONIN I SERPL DL<=0.01 NG/ML-MCNC: <0.03 NG/ML
TROPONIN I SERPL DL<=0.01 NG/ML-MCNC: <0.03 NG/ML
WBC # BLD AUTO: 10.12 K/UL (ref 3.9–12.7)
WBC # BLD AUTO: 11.3 K/UL (ref 3.9–12.7)

## 2020-11-05 PROCEDURE — 94761 N-INVAS EAR/PLS OXIMETRY MLT: CPT

## 2020-11-05 PROCEDURE — 36415 COLL VENOUS BLD VENIPUNCTURE: CPT

## 2020-11-05 PROCEDURE — 84484 ASSAY OF TROPONIN QUANT: CPT | Mod: 91

## 2020-11-05 PROCEDURE — 25000003 PHARM REV CODE 250: Performed by: NURSE PRACTITIONER

## 2020-11-05 PROCEDURE — 80048 BASIC METABOLIC PNL TOTAL CA: CPT

## 2020-11-05 PROCEDURE — G0378 HOSPITAL OBSERVATION PER HR: HCPCS

## 2020-11-05 PROCEDURE — 85025 COMPLETE CBC W/AUTO DIFF WBC: CPT

## 2020-11-05 PROCEDURE — 25000003 PHARM REV CODE 250: Performed by: FAMILY MEDICINE

## 2020-11-05 PROCEDURE — 84484 ASSAY OF TROPONIN QUANT: CPT

## 2020-11-05 RX ORDER — LISINOPRIL 20 MG/1
20 TABLET ORAL DAILY
Status: DISCONTINUED | OUTPATIENT
Start: 2020-11-05 | End: 2020-11-05 | Stop reason: HOSPADM

## 2020-11-05 RX ORDER — ENOXAPARIN SODIUM 100 MG/ML
1 INJECTION SUBCUTANEOUS
Status: DISCONTINUED | OUTPATIENT
Start: 2020-11-05 | End: 2020-11-05 | Stop reason: HOSPADM

## 2020-11-05 RX ADMIN — PANTOPRAZOLE SODIUM 40 MG: 40 TABLET, DELAYED RELEASE ORAL at 08:11

## 2020-11-05 RX ADMIN — PAROXETINE HYDROCHLORIDE 20 MG: 20 TABLET, FILM COATED ORAL at 08:11

## 2020-11-05 RX ADMIN — ASPIRIN 81 MG: 81 TABLET, CHEWABLE ORAL at 08:11

## 2020-11-05 RX ADMIN — LISINOPRIL 20 MG: 20 TABLET ORAL at 11:11

## 2020-11-05 NOTE — SUBJECTIVE & OBJECTIVE
Past Medical History:   Diagnosis Date    Abnormal Pap smear of cervix     Atrial fibrillation     Hypertension        Past Surgical History:   Procedure Laterality Date    breast implants  2001    BREAST SURGERY Bilateral 2001    implants    KIDNEY STONE SURGERY      TOE SURGERY      TUBAL LIGATION         Review of patient's allergies indicates:   Allergen Reactions    Keppra [levetiracetam]     Statins-hmg-coa reductase inhibitors      Abdominal pain.         No current facility-administered medications on file prior to encounter.      Current Outpatient Medications on File Prior to Encounter   Medication Sig    apixaban (ELIQUIS ORAL) Take 5 mg by mouth.    aspirin (ECOTRIN) 81 MG EC tablet Take 81 mg by mouth once daily.    co-enzyme Q-10 30 mg capsule Take 30 mg by mouth 3 (three) times daily.    ezetimibe (ZETIA) 10 mg tablet Take 10 mg by mouth once daily.    flecainide (TAMBOCOR) 50 MG Tab Take 1 tablet by mouth 3 (three) times daily.    lisinopril (PRINIVIL,ZESTRIL) 20 MG tablet Take 1 tablet by mouth once daily.    pantoprazole (PROTONIX) 20 MG tablet Take 40 mg by mouth once daily.     paroxetine (PAXIL) 20 MG tablet Take 1 tablet (20 mg total) by mouth every morning.    propylene glycol (SYSTANE BALANCE) 0.6 % Drop Systane (PF) 0.4 %-0.3 % eye drops in a dropperette    traZODone (DESYREL) 100 MG tablet Take 1 tablet (100 mg total) by mouth nightly as needed for Insomnia. Take 2 hr before bedtime    ALPRAZolam (XANAX) 0.5 MG tablet Take 1 tablet (0.5 mg total) by mouth 2 (two) times daily as needed for Anxiety.     Family History     None        Tobacco Use    Smoking status: Never Smoker    Smokeless tobacco: Never Used   Substance and Sexual Activity    Alcohol use: Not Currently    Drug use: Never    Sexual activity: Not on file     Review of Systems   Constitutional: Negative for unexpected weight change.   Gastrointestinal: Positive for constipation.     Objective:      Vital Signs (Most Recent):  Temp: 97.6 °F (36.4 °C) (11/04/20 1806)  Pulse: (!) 55 (11/04/20 1915)  Resp: 18 (11/04/20 2000)  BP: (!) 173/78 (11/04/20 1915)  SpO2: 98 % (11/04/20 1915) Vital Signs (24h Range):  Temp:  [97.6 °F (36.4 °C)] 97.6 °F (36.4 °C)  Pulse:  [55-62] 55  Resp:  [18-24] 18  SpO2:  [97 %-100 %] 98 %  BP: (173-194)/(78-84) 173/78     Weight: 56.2 kg (124 lb)  Body mass index is 22.68 kg/m².    Physical Exam  Vitals signs reviewed.   Constitutional:       Appearance: Normal appearance. She is not ill-appearing or diaphoretic.   HENT:      Head: Normocephalic and atraumatic.      Right Ear: External ear normal.      Left Ear: External ear normal.      Mouth/Throat:      Mouth: Mucous membranes are moist.   Eyes:      General: No scleral icterus.        Right eye: No discharge.         Left eye: No discharge.   Neck:      Musculoskeletal: Normal range of motion and neck supple.   Cardiovascular:      Rate and Rhythm: Regular rhythm. Bradycardia present.      Pulses: Normal pulses.      Heart sounds: Normal heart sounds.   Pulmonary:      Effort: Pulmonary effort is normal.      Breath sounds: Normal breath sounds.   Abdominal:      Palpations: Abdomen is soft.      Tenderness: There is no abdominal tenderness.   Genitourinary:     Comments: No Petty  Musculoskeletal: Normal range of motion.      Right lower leg: No edema.      Left lower leg: No edema.   Skin:     General: Skin is dry.      Capillary Refill: Capillary refill takes 2 to 3 seconds.   Neurological:      General: No focal deficit present.      Mental Status: She is alert and oriented to person, place, and time.   Psychiatric:         Mood and Affect: Mood normal.         Behavior: Behavior normal.             Significant Labs:   CBC:   Recent Labs   Lab 11/04/20  1820   WBC 10.08   HGB 14.5   HCT 44.3        CMP:   Recent Labs   Lab 11/04/20  1820      K 4.0      CO2 27   GLU 77   BUN 22   CREATININE 1.7*   CALCIUM  9.7   PROT 7.3   ALBUMIN 4.5   BILITOT 1.0   ALKPHOS 44*   AST 23   ALT 16   ANIONGAP 12   EGFRNONAA 29.3*     Cardiac Markers:   Recent Labs   Lab 11/04/20  1820   BNP 66     Magnesium:   Recent Labs   Lab 11/04/20  1820   MG 1.9     Troponin:   Recent Labs   Lab 11/04/20 1820   TROPONINI <0.030       Significant Imaging: I have reviewed all pertinent imaging results/findings within the past 24 hours.   X-ray Chest Ap Portable    Result Date: 11/4/2020  CLINICAL HISTORY: 75 years (1945) Female Chest Pain Chest pain,pain radiating to left arm TECHNIQUE: Portable AP radiograph the chest. COMPARISON: None available. FINDINGS: The lungs appear clear. There is no pneumothorax. Costophrenic angles are seen without effusion. The heart is normal in size .  The mediastinum is within normal limits. Osseous structures and visualized upper abdomen appear within normal limits. IMPRESSION: No acute cardiac or pulmonary process. Electronically Signed by Deana Norwood M.D. on 11/4/2020 6:56 PM

## 2020-11-05 NOTE — ASSESSMENT & PLAN NOTE
Cardiac monitor for arrhthymias  Trend troponin  Aspirin and NTG  Consult cardiology  States that she had a negative stress test, outpatient about a year ago

## 2020-11-05 NOTE — DISCHARGE SUMMARY
"[facility] Medicine  Discharge Summary      Patient Name: Madeline Leavitt  MRN: [id number]  Admission Date: 11/4/2020  Hospital Length of Stay: 0 days  Discharge Date and Time: No discharge date for patient encounter.  Attending Physician: Jn Fay MD   Discharging Provider: Jn Fay MD  Primary Care Provider: Juan M Lora MD      HPI:   75-year-old female with history of atrial fibrillation presented to the emergency department with chest pain  Reports a very brief episode of left-sided mild to moderate sharp chest pain while resting at home, around noon today, resolved spontaneously  Reports that while watching TV around 5 PM, her heart "jumped out of a rhythm and, very fast"  Reports being very weak, shortness of breath, and almost passed out  Took a dose of her left over flecanide (which was recently discontinued due to bradycardia)  Subsequently came to the ED.  States that the episode lasted about 45 minutes    On arrival she was noted to be in sinus rhythm, sinus bradycardia.  Blood pressure was slightly high.  Troponin was negative.  Laboratory studies showed  elevated creatinine at 1.7 with estimated GFR 29.3  EKG, personally reviewed, showed sinus bradycardia, no ST elevation    * No surgery found *      Hospital Course:   Patient is admitted for near-syncope and chest pain.  Serial cardiac enzymes unremarkable.  There was concern for sick sinus syndrome.  Patient is scheduled for pacemaker placement in 2 weeks.  Cardiology evaluated patient and did not recommend intervention this time.  Recommended scheduled pacemaker placement as an outpatient.  Patient remained chest pain-free during hospitalization.  No evidence of high-degree heart blocks or pauses.  Patient was stable at discharge to home with instructions to follow up with Cardiology, PCP.    General: Patient resting comfortably in no acute distress. Appears as stated age. Calm  Eyes: EOM intact. No " conjunctivae injection. No scleral icterus.  ENT: Hearing grossly intact. No discharge from ears. No nasal discharge.   CVS: Bradycardic. No LE edema BL.  Lungs: CTA BL, no wheezing or crackles. Good breath sounds. No accessory muscle use. No acute respiratory distress  Neuro: AOx3. GCS 15. Cranial nerves grossly intact. Moves all extremities equally. Follows commands. Responds appropriately      Consults:   Consults (From admission, onward)        Status Ordering Provider     Inpatient consult to Cardiology  Once     Provider:  Lui Butler MD    Completed DORIS GRANT     Inpatient consult to Hospitalist  Once     Provider:  Manda Villar MD    Acknowledged ROMÁN RENEE          No new Assessment & Plan notes have been filed under this hospital service since the last note was generated.  Service: Hospital Medicine    Final Active Diagnoses:    Diagnosis Date Noted POA    PRINCIPAL PROBLEM:  Atrial fibrillation [I48.91] 10/29/2019 Unknown    Decreased GFR [R94.4] 11/04/2020 Yes    Hypertension, essential [I10] 10/29/2019 Yes    Hyperlipidemia [E78.5] 10/29/2019 Yes    Anxiety and depression [F41.9, F32.9] 10/29/2019 Yes      Problems Resolved During this Admission:    Diagnosis Date Noted Date Resolved POA    Chest pain [R07.9] 11/04/2020 11/05/2020 Yes       Discharged Condition: fair    Disposition: Home or Self Care    Follow Up:  Follow-up Information     Lui Butler MD In 2 weeks.    Specialty: Cardiology  Why: Cardiology follow-up, pacemaker placement  Contact information:  [address] [facility]  [address]  [facility] [address]  [phone]             Juna M Lora MD.    Specialties: Family Medicine, Internal Medicine  Why: As needed, For check up s/p hospital discharge  Contact information:  [address]  [facility] [address]  [phone]             [facility].    Specialty: Emergency Medicine  Why: As needed  Contact information:  [address]  [facility] [id number] [phone]  Additional information:  1st floor               Patient Instructions:      Diet Cardiac     Notify your health care provider if you experience any of the following:  temperature >100.4     Notify your health care provider if you experience any of the following:  persistent nausea and vomiting or diarrhea     Notify your health care provider if you experience any of the following:  severe uncontrolled pain     Notify your health care provider if you experience any of the following:  redness, tenderness, or signs of infection (pain, swelling, redness, odor or green/yellow discharge around incision site)     Notify your health care provider if you experience any of the following:  difficulty breathing or increased cough     Notify your health care provider if you experience any of the following:  severe persistent headache     Notify your health care provider if you experience any of the following:  worsening rash     Notify your health care provider if you experience any of the following:  persistent dizziness, light-headedness, or visual disturbances     Notify your health care provider if you experience any of the following:  increased confusion or weakness     Activity as tolerated       Significant Diagnostic Studies: Labs:   CMP   Recent Labs   Lab 11/04/20 1820 11/05/20 0320 11/05/20 0345    137 138   K 4.0 3.8 4.0    103 101   CO2 27 25 28   GLU 77 97 94   BUN 22 23 23   CREATININE 1.7* 1.2 1.3   CALCIUM 9.7 8.9 9.1   PROT 7.3  --   --    ALBUMIN 4.5  --   --    BILITOT 1.0  --   --    ALKPHOS 44*  --   --    AST 23  --   --    ALT 16  --   --    ANIONGAP 12 9 9   ESTGFRAFRICA 33.8* 51.4* 46.7*   EGFRNONAA 29.3* 44.6* 40.5*   , CBC   Recent Labs   Lab 11/04/20 1820 11/05/20 0320 11/05/20  0345   WBC 10.08 10.12 11.30   HGB 14.5 13.3 13.6   HCT 44.3 41.4 43.2    241 253    and Troponin   Recent Labs   Lab 11/04/20 2132 11/05/20 0320  11/05/20  0345   TROPONINI <0.030 <0.030 <0.030       Pending Diagnostic Studies:     None         Medications:  Reconciled Home Medications:      Medication List      CONTINUE taking these medications    ALPRAZolam 0.5 MG tablet  Commonly known as: XANAX  Take 1 tablet (0.5 mg total) by mouth 2 (two) times daily as needed for Anxiety.     aspirin 81 MG EC tablet  Commonly known as: ECOTRIN  Take 81 mg by mouth once daily.     co-enzyme Q-10 30 mg capsule  Take 30 mg by mouth 3 (three) times daily.     ELIQUIS ORAL  Take 5 mg by mouth.     ezetimibe 10 mg tablet  Commonly known as: ZETIA  Take 10 mg by mouth once daily.     flecainide 50 MG Tab  Commonly known as: TAMBOCOR  Take 1 tablet by mouth 3 (three) times daily.     lisinopriL 20 MG tablet  Commonly known as: PRINIVIL,ZESTRIL  Take 1 tablet by mouth once daily.     pantoprazole 20 MG tablet  Commonly known as: PROTONIX  Take 40 mg by mouth once daily.     paroxetine 20 MG tablet  Commonly known as: PAXIL  Take 1 tablet (20 mg total) by mouth every morning.     SYSTANE BALANCE 0.6 % Drop  Generic drug: propylene glycol  Systane (PF) 0.4 %-0.3 % eye drops in a dropperette     traZODone 100 MG tablet  Commonly known as: DESYREL  Take 1 tablet (100 mg total) by mouth nightly as needed for Insomnia. Take 2 hr before bedtime            Indwelling Lines/Drains at time of discharge:   Lines/Drains/Airways     None                 Time spent on the discharge of patient: 25 minutes  Patient was seen and examined on the date of discharge and determined to be suitable for discharge.         Jn Fay MD  Department of Hospital Medicine  [facility]  Date of service: 11/05/2020

## 2020-11-05 NOTE — HOSPITAL COURSE
Patient is admitted for near-syncope and chest pain.  Serial cardiac enzymes unremarkable.  There was concern for sick sinus syndrome.  Patient is scheduled for pacemaker placement in 2 weeks.  Cardiology evaluated patient and did not recommend intervention this time.  Recommended scheduled pacemaker placement as an outpatient.  Patient remained chest pain-free during hospitalization.  No evidence of high-degree heart blocks or pauses.  Patient was stable at discharge to home with instructions to follow up with Cardiology, PCP.    General: Patient resting comfortably in no acute distress. Appears as stated age. Calm  Eyes: EOM intact. No conjunctivae injection. No scleral icterus.  ENT: Hearing grossly intact. No discharge from ears. No nasal discharge.   CVS: Bradycardic. No LE edema BL.  Lungs: CTA BL, no wheezing or crackles. Good breath sounds. No accessory muscle use. No acute respiratory distress  Neuro: AOx3. GCS 15. Cranial nerves grossly intact. Moves all extremities equally. Follows commands. Responds appropriately

## 2020-11-05 NOTE — CONSULTS
[facility]   Cardiology Note    Consult Requested By: primary  Reason for Consult: a fib    SUBJECTIVE:     History of Present Illness: 75 year old female w/ PMHx of P. A trial fibrillation on flecainide and eliquis at home who was in her usual state of health until about 1 week ago when she was seen in our office by Dr. Butler. Pt was noted to be in sinus rhythm w/ significant bradycardia at the time. Her flecainide was held and she was scheduled for a pacemaker which is in 2 weeks at [facility]. Last night she developed palpitations which she states feels very similar to her prior episodes of atrial fibrillation. She states her symptoms progressed and she developed left arm tingling and neck pain. She took a flecainide and she states by the time she arrived to the ED her heart rate slowed down significantly and symptoms resolved. EKG on admission shows sinus bradycardia HR in the 50s. Troponin negative x 2. CXR unremarkable. She also had an RAHUL on admit which has resolved w/ IVFs.   This morning she feels quite well w/ no complaints. Denies CP/SOB/weakness/dizziness/palpitations.   Telemetry reviewed, while sleeping her HR dropped to 41 bpm otherwise she has been in the 50s and sinus rhythm since admission.  Pt does have a loop recorder which we reviewed this morning and symptoms prior to hospital presentation are consistent w/ a fib w/ RVR.    Review of patient's allergies indicates:   Allergen Reactions    Keppra [levetiracetam]     Statins-hmg-coa reductase inhibitors      Abdominal pain.         Past Medical History:   Diagnosis Date    Abnormal Pap smear of cervix     Anticoagulant long-term use     Arthritis     Atrial fibrillation     Digestive disorder     Hypertension     Renal disorder     Seizures     Stroke      Past Surgical History:   Procedure Laterality Date    ABDOMINAL SURGERY      breast implants  2001    BREAST SURGERY Bilateral 2001    implants    CARDIAC SURGERY       COSMETIC SURGERY      EYE SURGERY      KIDNEY STONE SURGERY      TOE SURGERY      TUBAL LIGATION       Family History   Problem Relation Age of Onset    Heart disease Mother     Stroke Mother     Heart disease Father     Cancer Sister      Social History     Tobacco Use    Smoking status: Never Smoker    Smokeless tobacco: Never Used   Substance Use Topics    Alcohol use: Never     Frequency: Never    Drug use: Never       Review of Systems:  Review of Systems   Cardiovascular: Positive for palpitations.   Musculoskeletal:        Left arm tingling  Neck pain   All other systems reviewed and are negative.      OBJECTIVE:     Vital Signs (Most Recent)  Temp: 98.1 °F (36.7 °C) (11/05/20 0720)  Pulse: (!) 53 (11/05/20 0720)  Resp: 17 (11/05/20 0720)  BP: (!) 158/67 (11/05/20 0720)  SpO2: 96 % (11/05/20 0720)    Vital Signs Range (Last 24H):  Temp:  [97.5 °F (36.4 °C)-98.4 °F (36.9 °C)]   Pulse:  [48-62]   Resp:  [16-24]   BP: (139-198)/(63-84)   SpO2:  [96 %-100 %]     I & O (Last 24H):    Intake/Output Summary (Last 24 hours) at 11/5/2020 0909  Last data filed at 11/5/2020 0642  Gross per 24 hour   Intake 875 ml   Output --   Net 875 ml       Current Diet:     Current Diet Order   Procedures    Diet NPO Except for: Medication     Order Specific Question:   Except for     Answer:   Medication        Allergies:  Review of patient's allergies indicates:   Allergen Reactions    Keppra [levetiracetam]     Statins-hmg-coa reductase inhibitors      Abdominal pain.         Meds:  Scheduled Meds:   aspirin  81 mg Oral Daily    enoxparin  1 mg/kg Subcutaneous Q12H    pantoprazole  40 mg Oral Daily    paroxetine  20 mg Oral QAM     Continuous Infusions:   sodium chloride 0.9% 75 mL/hr at 11/04/20 2207     PRN Meds:acetaminophen, ALPRAZolam, bisacodyL, melatonin, nitroGLYCERIN, ondansetron, promethazine (PHENERGAN) IVPB, sodium chloride 0.9%, traZODone    Oxygen/Ventilator Data (Last 24H):  (if applicable)         Hemodynamic Parameters (Last 24H):   (if applicable)        Laboratory and Radiology Data:  Recent Results (from the past 24 hour(s))   CBC auto differential    Collection Time: 11/04/20  6:20 PM   Result Value Ref Range    WBC 10.08 3.90 - 12.70 K/uL    RBC 4.84 4.00 - 5.40 M/uL    Hemoglobin 14.5 12.0 - 16.0 g/dL    Hematocrit 44.3 37.0 - 48.5 %    MCV 92 82 - 98 fL    MCH 30.0 27.0 - 31.0 pg    MCHC 32.7 32.0 - 36.0 g/dL    RDW 14.4 11.5 - 14.5 %    Platelets 270 150 - 350 K/uL    MPV 10.8 9.2 - 12.9 fL    Immature Granulocytes 0.3 0.0 - 0.5 %    Gran # (ANC) 4.0 1.8 - 7.7 K/uL    Immature Grans (Abs) 0.03 0.00 - 0.04 K/uL    Lymph # 5.0 (H) 1.0 - 4.8 K/uL    Mono # 0.8 0.3 - 1.0 K/uL    Eos # 0.2 0.0 - 0.5 K/uL    Baso # 0.07 0.00 - 0.20 K/uL    nRBC 0 0 /100 WBC    Gran % 40.0 38.0 - 73.0 %    Lymph % 50.0 (H) 18.0 - 48.0 %    Mono % 7.4 4.0 - 15.0 %    Eosinophil % 1.6 0.0 - 8.0 %    Basophil % 0.7 0.0 - 1.9 %    Differential Method Automated    Comprehensive metabolic panel    Collection Time: 11/04/20  6:20 PM   Result Value Ref Range    Sodium 139 136 - 145 mmol/L    Potassium 4.0 3.5 - 5.1 mmol/L    Chloride 100 95 - 110 mmol/L    CO2 27 23 - 29 mmol/L    Glucose 77 70 - 110 mg/dL    BUN 22 8 - 23 mg/dL    Creatinine 1.7 (H) 0.5 - 1.4 mg/dL    Calcium 9.7 8.7 - 10.5 mg/dL    Total Protein 7.3 6.0 - 8.4 g/dL    Albumin 4.5 3.5 - 5.2 g/dL    Total Bilirubin 1.0 0.1 - 1.0 mg/dL    Alkaline Phosphatase 44 (L) 55 - 135 U/L    AST 23 10 - 40 U/L    ALT 16 10 - 44 U/L    Anion Gap 12 8 - 16 mmol/L    eGFR if African American 33.8 (A) >60 mL/min/1.73 m^2    eGFR if non  29.3 (A) >60 mL/min/1.73 m^2   Troponin I #1    Collection Time: 11/04/20  6:20 PM   Result Value Ref Range    Troponin I <0.030 <=0.040 ng/mL   B-Type natriuretic peptide (BNP)    Collection Time: 11/04/20  6:20 PM   Result Value Ref Range    BNP 66 0 - 99 pg/mL   Magnesium    Collection Time: 11/04/20  6:20 PM   Result Value  Ref Range    Magnesium 1.9 1.6 - 2.6 mg/dL   COVID-19 Rapid Screening    Collection Time: 11/04/20  7:14 PM   Result Value Ref Range    SARS-CoV-2 RNA, Amplification, Qual Negative Negative   Troponin I    Collection Time: 11/04/20  9:32 PM   Result Value Ref Range    Troponin I <0.030 <=0.040 ng/mL   Troponin I    Collection Time: 11/05/20  3:20 AM   Result Value Ref Range    Troponin I <0.030 <=0.040 ng/mL   Basic metabolic panel    Collection Time: 11/05/20  3:20 AM   Result Value Ref Range    Sodium 137 136 - 145 mmol/L    Potassium 3.8 3.5 - 5.1 mmol/L    Chloride 103 95 - 110 mmol/L    CO2 25 23 - 29 mmol/L    Glucose 97 70 - 110 mg/dL    BUN 23 8 - 23 mg/dL    Creatinine 1.2 0.5 - 1.4 mg/dL    Calcium 8.9 8.7 - 10.5 mg/dL    Anion Gap 9 8 - 16 mmol/L    eGFR if African American 51.4 (A) >60 mL/min/1.73 m^2    eGFR if non  44.6 (A) >60 mL/min/1.73 m^2   CBC auto differential    Collection Time: 11/05/20  3:20 AM   Result Value Ref Range    WBC 10.12 3.90 - 12.70 K/uL    RBC 4.58 4.00 - 5.40 M/uL    Hemoglobin 13.3 12.0 - 16.0 g/dL    Hematocrit 41.4 37.0 - 48.5 %    MCV 90 82 - 98 fL    MCH 29.0 27.0 - 31.0 pg    MCHC 32.1 32.0 - 36.0 g/dL    RDW 14.4 11.5 - 14.5 %    Platelets 241 150 - 350 K/uL    MPV 10.8 9.2 - 12.9 fL    Immature Granulocytes 0.3 0.0 - 0.5 %    Gran # (ANC) 2.9 1.8 - 7.7 K/uL    Immature Grans (Abs) 0.03 0.00 - 0.04 K/uL    Lymph # 6.2 (H) 1.0 - 4.8 K/uL    Mono # 0.6 0.3 - 1.0 K/uL    Eos # 0.3 0.0 - 0.5 K/uL    Baso # 0.06 0.00 - 0.20 K/uL    nRBC 0 0 /100 WBC    Gran % 28.7 (L) 38.0 - 73.0 %    Lymph % 61.6 (H) 18.0 - 48.0 %    Mono % 6.2 4.0 - 15.0 %    Eosinophil % 2.6 0.0 - 8.0 %    Basophil % 0.6 0.0 - 1.9 %    Differential Method Automated    Troponin I    Collection Time: 11/05/20  3:45 AM   Result Value Ref Range    Troponin I <0.030 <=0.040 ng/mL   Basic metabolic panel    Collection Time: 11/05/20  3:45 AM   Result Value Ref Range    Sodium 138 136 - 145 mmol/L     Potassium 4.0 3.5 - 5.1 mmol/L    Chloride 101 95 - 110 mmol/L    CO2 28 23 - 29 mmol/L    Glucose 94 70 - 110 mg/dL    BUN 23 8 - 23 mg/dL    Creatinine 1.3 0.5 - 1.4 mg/dL    Calcium 9.1 8.7 - 10.5 mg/dL    Anion Gap 9 8 - 16 mmol/L    eGFR if African American 46.7 (A) >60 mL/min/1.73 m^2    eGFR if non African American 40.5 (A) >60 mL/min/1.73 m^2   CBC auto differential    Collection Time: 11/05/20  3:45 AM   Result Value Ref Range    WBC 11.30 3.90 - 12.70 K/uL    RBC 4.74 4.00 - 5.40 M/uL    Hemoglobin 13.6 12.0 - 16.0 g/dL    Hematocrit 43.2 37.0 - 48.5 %    MCV 91 82 - 98 fL    MCH 28.7 27.0 - 31.0 pg    MCHC 31.5 (L) 32.0 - 36.0 g/dL    RDW 14.4 11.5 - 14.5 %    Platelets 253 150 - 350 K/uL    MPV 10.9 9.2 - 12.9 fL    Immature Granulocytes 0.3 0.0 - 0.5 %    Gran # (ANC) 3.2 1.8 - 7.7 K/uL    Immature Grans (Abs) 0.03 0.00 - 0.04 K/uL    Lymph # 7.2 (H) 1.0 - 4.8 K/uL    Mono # 0.6 0.3 - 1.0 K/uL    Eos # 0.2 0.0 - 0.5 K/uL    Baso # 0.09 0.00 - 0.20 K/uL    nRBC 0 0 /100 WBC    Gran % 28.0 (L) 38.0 - 73.0 %    Lymph % 63.3 (H) 18.0 - 48.0 %    Mono % 5.6 4.0 - 15.0 %    Eosinophil % 2.0 0.0 - 8.0 %    Basophil % 0.8 0.0 - 1.9 %    Differential Method Automated      Imaging Results          X-Ray Chest AP Portable (Final result)  Result time 11/04/20 18:18:25    Final result by Deana Norwood MD (11/04/20 18:18:25)                 Narrative:    CLINICAL HISTORY:  75 years (1945) Female Chest Pain Chest pain,pain radiating to  left arm    TECHNIQUE:  Portable AP radiograph the chest.    COMPARISON:  None available.    FINDINGS:  The lungs appear clear. There is no pneumothorax. Costophrenic angles  are seen without effusion. The heart is normal in size .  The  mediastinum is within normal limits. Osseous structures and visualized  upper abdomen appear within normal limits.    IMPRESSION:  No acute cardiac or pulmonary process.            Electronically Signed by Deana Norwood M.D. on  11/4/2020 6:56 PM                              12-lead EKG interpretation:  (if applicable)      Current Cardiac Rhythm:   (if applicable)    Physical Exam:   Physical Exam   Constitutional: She is oriented to person, place, and time and well-developed, well-nourished, and in no distress.   HENT:   Head: Normocephalic and atraumatic.   Cardiovascular: Normal rate, regular rhythm, normal heart sounds and intact distal pulses.   No murmur heard.  Pulmonary/Chest: Effort normal and breath sounds normal.   Musculoskeletal:         General: No edema.   Neurological: She is alert and oriented to person, place, and time.   Skin: Skin is warm and dry. No rash noted.   Nursing note and vitals reviewed.      ASSESSMENT/PLAN:   Assessment:     A fib w/ RVR, now resolved, on eliquis at home  Presence of Loop recorder  SSS  - planning for pacemaker implantation w/ Dr. Butler in 2 weeks  Left arm tingling/neck pain, now resolved  - cardiac enzymes negative  RAHUL, resolved w/ IVFs    Plan:     Ct. To hold flecainide w/ significant bradycardia.   Given that pt is asymptomatic at this time and no evidence of high degree AV blocks or pauses on telemetry, will likely continue to plan for pacemaker implantation as an outpatient.   I will speak to Dr. Butler, further recommendations to follow.

## 2020-11-05 NOTE — ASSESSMENT & PLAN NOTE
Reports to have paroxysmal atrial fibrillation with tachy and bradycardia  Has loop recorder in situ  Very symptomatic  States that there is a plan for permanent pacemaker implantation in 2 weeks  Consult Cardiology  NPO past midnight for possible intervention  Continue apixaban

## 2020-11-05 NOTE — ED PROVIDER NOTES
Encounter Date: 11/4/2020       History     Chief Complaint   Patient presents with    Chest Pain     L arm pain, both have resolved     Patient here with reported onset of chest pain this morning in couple episodes of left-sided chest pain that resolved later on she developed palpitations with significant elevated rate has a history atrial fibrillation states she got very lightheaded very short of breath when it occurred states that she had been on flecainide however her cardiologist Dr. SCHAFER told at the stop flecainide secondary to extreme bradycardia states her heart rate was going down into the 30s while she was taking when this episode occurred today and it did not stop she took 1 dose of flecainide and by the time she arrived emergency department her heart rate improved patient reports that there is a plan for her to receive a pacemaker to assist with treatment of her atrial fibrillation that this is not scheduled for the next 2 weeks        Review of patient's allergies indicates:   Allergen Reactions    Keppra [levetiracetam]     Statins-hmg-coa reductase inhibitors      Abdominal pain.       Past Medical History:   Diagnosis Date    Abnormal Pap smear of cervix     Anticoagulant long-term use     Arthritis     Atrial fibrillation     Digestive disorder     Hypertension     Renal disorder     Seizures     Stroke      Past Surgical History:   Procedure Laterality Date    ABDOMINAL SURGERY      breast implants  2001    BREAST SURGERY Bilateral 2001    implants    CARDIAC SURGERY      COSMETIC SURGERY      EYE SURGERY      KIDNEY STONE SURGERY      TOE SURGERY      TUBAL LIGATION       Family History   Problem Relation Age of Onset    Heart disease Mother     Stroke Mother     Heart disease Father     Cancer Sister      Social History     Tobacco Use    Smoking status: Never Smoker    Smokeless tobacco: Never Used   Substance Use Topics    Alcohol use: Never     Frequency: Never     Drug use: Never     Review of Systems   Constitutional: Positive for fatigue. Negative for chills, diaphoresis and fever.   HENT: Negative for congestion, ear pain and sore throat.    Eyes: Negative for pain.   Respiratory: Positive for chest tightness and shortness of breath. Negative for cough.    Cardiovascular: Positive for chest pain and palpitations. Negative for leg swelling.   Gastrointestinal: Negative for abdominal pain, constipation, diarrhea, nausea and vomiting.   Genitourinary: Negative for dysuria, frequency and hematuria.   Musculoskeletal: Negative for neck pain.   Skin: Negative for rash.   Neurological: Positive for light-headedness. Negative for weakness and headaches.   Hematological: Negative for adenopathy.       Physical Exam     Initial Vitals [11/04/20 1806]   BP Pulse Resp Temp SpO2   (!) 194/84 (!) 59 20 97.6 °F (36.4 °C) 100 %      MAP       --         Physical Exam    Constitutional: She appears well-developed and well-nourished. No distress.   HENT:   Head: Normocephalic and atraumatic.   Right Ear: External ear normal.   Left Ear: External ear normal.   Mouth/Throat: Oropharynx is clear and moist.   Eyes: Conjunctivae and EOM are normal. Pupils are equal, round, and reactive to light.   Neck: Normal range of motion. Neck supple.   Cardiovascular: Regular rhythm, normal heart sounds and intact distal pulses.   Bradycardic   Pulmonary/Chest: Breath sounds normal. No respiratory distress. She has no rales.   Abdominal: Soft. Bowel sounds are normal. There is no abdominal tenderness.   Musculoskeletal: Normal range of motion. No edema.   Neurological: She is alert and oriented to person, place, and time. GCS score is 15. GCS eye subscore is 4. GCS verbal subscore is 5. GCS motor subscore is 6.   Skin: Skin is warm and dry. Capillary refill takes less than 2 seconds. No rash noted.   Psychiatric: She has a normal mood and affect. Her behavior is normal.         ED Course   Procedures  Labs  Reviewed   CBC W/ AUTO DIFFERENTIAL - Abnormal; Notable for the following components:       Result Value    Lymph # 5.0 (*)     Lymph % 50.0 (*)     All other components within normal limits   COMPREHENSIVE METABOLIC PANEL - Abnormal; Notable for the following components:    Creatinine 1.7 (*)     Alkaline Phosphatase 44 (*)     eGFR if  33.8 (*)     eGFR if non  29.3 (*)     All other components within normal limits   TROPONIN I   B-TYPE NATRIURETIC PEPTIDE   SARS-COV-2 RNA AMPLIFICATION, QUAL   MAGNESIUM          Imaging Results          X-Ray Chest AP Portable (Final result)  Result time 11/04/20 18:18:25    Final result by Deana Norwood MD (11/04/20 18:18:25)                 Narrative:    CLINICAL HISTORY:  75 years (1945) Female Chest Pain Chest pain,pain radiating to  left arm    TECHNIQUE:  Portable AP radiograph the chest.    COMPARISON:  None available.    FINDINGS:  The lungs appear clear. There is no pneumothorax. Costophrenic angles  are seen without effusion. The heart is normal in size .  The  mediastinum is within normal limits. Osseous structures and visualized  upper abdomen appear within normal limits.    IMPRESSION:  No acute cardiac or pulmonary process.            Electronically Signed by Deana Norwood M.D. on 11/4/2020 6:56 PM                               Medical Decision Making:   ED Management:  I have discussed patient's care with the hospitalist to evaluate patient emergency department for admission patient has no further chest pain or palpitations in the emergency department                             Clinical Impression:       ICD-10-CM ICD-9-CM   1. Chest pain  R07.9 786.50   2. Arrhythmia  I49.9 427.9                          ED Disposition Condition    Observation                             John Sky MD  11/04/20 8638

## 2020-11-05 NOTE — PLAN OF CARE
Medicare Outpatient Observation Notice was signed, explained and given to patient/caregiver on 11/05/2020 at 9:32aam     answered all questions

## 2020-11-05 NOTE — PLAN OF CARE
11/05/20 1257   Final Note   Assessment Type Final Discharge Note   Anticipated Discharge Disposition Home     No needs at this time.

## 2020-11-05 NOTE — ASSESSMENT & PLAN NOTE
No recent data to compare  Results for ARLENE STEEL (MRN [id number]) as of 11/4/2020 20:58   Ref. Range 7/3/2018 13:08 11/1/2018 18:46 11/4/2020 18:20   BUN Latest Ref Range: 8 - 23 mg/dL 25  22   Creatinine Latest Ref Range: 0.5 - 1.4 mg/dL 0.98 (H) 1.0 1.7 (H)   BUN/CREAT RATIO Latest Ref Range: 6 - 22 (calc) 26 (H)     eGFR if non African American Latest Ref Range: >60 mL/min/1.73 m^2 58 (L) 56 (A) 29.3 (A)   Hold lisinopril  Gentle hydration  No NSAIDs  Repeat chemistry in a.m.

## 2020-11-05 NOTE — HPI
"75-year-old female with history of atrial fibrillation presented to the emergency department with chest pain  Reports a very brief episode of left-sided mild to moderate sharp chest pain while resting at home, around noon today, resolved spontaneously  Reports that while watching TV around 5 PM, her heart "jumped out of a rhythm and, very fast"  Reports being very weak, shortness of breath, and almost passed out  Took a dose of her left over flecanide (which was recently discontinued due to bradycardia)  Subsequently came to the ED.  States that the episode lasted about 45 minutes    On arrival she was noted to be in sinus rhythm, sinus bradycardia.  Blood pressure was slightly high.  Troponin was negative.  Laboratory studies showed  elevated creatinine at 1.7 with estimated GFR 29.3  EKG, personally reviewed, showed sinus bradycardia, no ST elevation  "

## 2020-11-05 NOTE — PLAN OF CARE
Problem: Fall Injury Risk  Goal: Absence of Fall and Fall-Related Injury  Outcome: Ongoing, Progressing     Problem: Adult Inpatient Plan of Care  Goal: Plan of Care Review  Outcome: Ongoing, Progressing  Goal: Patient-Specific Goal (Individualization)  Outcome: Ongoing, Progressing  Goal: Absence of Hospital-Acquired Illness or Injury  Outcome: Ongoing, Progressing  Goal: Optimal Comfort and Wellbeing  Outcome: Ongoing, Progressing  Goal: Readiness for Transition of Care  Outcome: Ongoing, Progressing  Goal: Rounds/Family Conference  Outcome: Ongoing, Progressing     Problem: Chest Pain  Goal: Resolution of Chest Pain Symptoms  Outcome: Ongoing, Progressing     Problem: Hypertension Acute  Goal: Blood Pressure Within Desired Range  Outcome: Ongoing, Progressing

## 2020-11-05 NOTE — PLAN OF CARE
Problem: Fall Injury Risk  Goal: Absence of Fall and Fall-Related Injury  Outcome: Met     Problem: Adult Inpatient Plan of Care  Goal: Plan of Care Review  Outcome: Met  Goal: Patient-Specific Goal (Individualization)  Outcome: Met  Goal: Absence of Hospital-Acquired Illness or Injury  Outcome: Met  Goal: Optimal Comfort and Wellbeing  Outcome: Met  Goal: Readiness for Transition of Care  Outcome: Met  Goal: Rounds/Family Conference  Outcome: Met     Problem: Chest Pain  Goal: Resolution of Chest Pain Symptoms  Outcome: Met     Problem: Hypertension Acute  Goal: Blood Pressure Within Desired Range  Outcome: Met

## 2020-11-05 NOTE — H&P
"[facility]  History & Physical    Patient Name: Madeline Leavitt  MRN: [id number]  Admission Date: 11/4/2020  Attending Physician: Manda Villar MD   Primary Care Provider: Juan M Lora MD         Patient information was obtained from patient, past medical records and ER records.     Subjective:     Principal Problem:Atrial fibrillation    Chief Complaint:   Chief Complaint   Patient presents with    Chest Pain     L arm pain, both have resolved        HPI: 75-year-old female with history of atrial fibrillation presented to the emergency department with chest pain  Reports a very brief episode of left-sided mild to moderate sharp chest pain while resting at home, around noon today, resolved spontaneously  Reports that while watching TV around 5 PM, her heart "jumped out of a rhythm and, very fast"  Reports being very weak, shortness of breath, and almost passed out  Took a dose of her left over flecanide (which was recently discontinued due to bradycardia)  Subsequently came to the ED.  States that the episode lasted about 45 minutes    On arrival she was noted to be in sinus rhythm, sinus bradycardia.  Blood pressure was slightly high.  Troponin was negative.  Laboratory studies showed  elevated creatinine at 1.7 with estimated GFR 29.3  EKG, personally reviewed, showed sinus bradycardia, no ST elevation    Past Medical History:   Diagnosis Date    Abnormal Pap smear of cervix     Atrial fibrillation     Hypertension        Past Surgical History:   Procedure Laterality Date    breast implants  2001    BREAST SURGERY Bilateral 2001    implants    KIDNEY STONE SURGERY      TOE SURGERY      TUBAL LIGATION         Review of patient's allergies indicates:   Allergen Reactions    Keppra [levetiracetam]     Statins-hmg-coa reductase inhibitors      Abdominal pain.         No current facility-administered medications on file prior to encounter.      Current Outpatient Medications " on File Prior to Encounter   Medication Sig    apixaban (ELIQUIS ORAL) Take 5 mg by mouth.    aspirin (ECOTRIN) 81 MG EC tablet Take 81 mg by mouth once daily.    co-enzyme Q-10 30 mg capsule Take 30 mg by mouth 3 (three) times daily.    ezetimibe (ZETIA) 10 mg tablet Take 10 mg by mouth once daily.    flecainide (TAMBOCOR) 50 MG Tab Take 1 tablet by mouth 3 (three) times daily.    lisinopril (PRINIVIL,ZESTRIL) 20 MG tablet Take 1 tablet by mouth once daily.    pantoprazole (PROTONIX) 20 MG tablet Take 40 mg by mouth once daily.     paroxetine (PAXIL) 20 MG tablet Take 1 tablet (20 mg total) by mouth every morning.    propylene glycol (SYSTANE BALANCE) 0.6 % Drop Systane (PF) 0.4 %-0.3 % eye drops in a dropperette    traZODone (DESYREL) 100 MG tablet Take 1 tablet (100 mg total) by mouth nightly as needed for Insomnia. Take 2 hr before bedtime    ALPRAZolam (XANAX) 0.5 MG tablet Take 1 tablet (0.5 mg total) by mouth 2 (two) times daily as needed for Anxiety.     Family History     None        Tobacco Use    Smoking status: Never Smoker    Smokeless tobacco: Never Used   Substance and Sexual Activity    Alcohol use: Not Currently    Drug use: Never    Sexual activity: Not on file     Review of Systems   Constitutional: Negative for unexpected weight change.   Gastrointestinal: Positive for constipation.     Objective:     Vital Signs (Most Recent):  Temp: 97.6 °F (36.4 °C) (11/04/20 1806)  Pulse: (!) 55 (11/04/20 1915)  Resp: 18 (11/04/20 2000)  BP: (!) 173/78 (11/04/20 1915)  SpO2: 98 % (11/04/20 1915) Vital Signs (24h Range):  Temp:  [97.6 °F (36.4 °C)] 97.6 °F (36.4 °C)  Pulse:  [55-62] 55  Resp:  [18-24] 18  SpO2:  [97 %-100 %] 98 %  BP: (173-194)/(78-84) 173/78     Weight: 56.2 kg (124 lb)  Body mass index is 22.68 kg/m².    Physical Exam  Vitals signs reviewed.   Constitutional:       Appearance: Normal appearance. She is not ill-appearing or diaphoretic.   HENT:      Head: Normocephalic and  atraumatic.      Right Ear: External ear normal.      Left Ear: External ear normal.      Mouth/Throat:      Mouth: Mucous membranes are moist.   Eyes:      General: No scleral icterus.        Right eye: No discharge.         Left eye: No discharge.   Neck:      Musculoskeletal: Normal range of motion and neck supple.   Cardiovascular:      Rate and Rhythm: Regular rhythm. Bradycardia present.      Pulses: Normal pulses.      Heart sounds: Normal heart sounds.   Pulmonary:      Effort: Pulmonary effort is normal.      Breath sounds: Normal breath sounds.   Abdominal:      Palpations: Abdomen is soft.      Tenderness: There is no abdominal tenderness.   Genitourinary:     Comments: No Petty  Musculoskeletal: Normal range of motion.      Right lower leg: No edema.      Left lower leg: No edema.   Skin:     General: Skin is dry.      Capillary Refill: Capillary refill takes 2 to 3 seconds.   Neurological:      General: No focal deficit present.      Mental Status: She is alert and oriented to person, place, and time.   Psychiatric:         Mood and Affect: Mood normal.         Behavior: Behavior normal.             Significant Labs:   CBC:   Recent Labs   Lab 11/04/20  1820   WBC 10.08   HGB 14.5   HCT 44.3        CMP:   Recent Labs   Lab 11/04/20  1820      K 4.0      CO2 27   GLU 77   BUN 22   CREATININE 1.7*   CALCIUM 9.7   PROT 7.3   ALBUMIN 4.5   BILITOT 1.0   ALKPHOS 44*   AST 23   ALT 16   ANIONGAP 12   EGFRNONAA 29.3*     Cardiac Markers:   Recent Labs   Lab 11/04/20  1820   BNP 66     Magnesium:   Recent Labs   Lab 11/04/20  1820   MG 1.9     Troponin:   Recent Labs   Lab 11/04/20  1820   TROPONINI <0.030       Significant Imaging: I have reviewed all pertinent imaging results/findings within the past 24 hours.   X-ray Chest Ap Portable    Result Date: 11/4/2020  CLINICAL HISTORY: 75 years (1945) Female Chest Pain Chest pain,pain radiating to left arm TECHNIQUE: Portable AP radiograph  the chest. COMPARISON: None available. FINDINGS: The lungs appear clear. There is no pneumothorax. Costophrenic angles are seen without effusion. The heart is normal in size .  The mediastinum is within normal limits. Osseous structures and visualized upper abdomen appear within normal limits. IMPRESSION: No acute cardiac or pulmonary process. Electronically Signed by Deana Norwood M.D. on 11/4/2020 6:56 PM      Assessment/Plan:     * Atrial fibrillation  Reports to have paroxysmal atrial fibrillation with tachy and bradycardia  Has loop recorder in situ  Very symptomatic  States that there is a plan for permanent pacemaker implantation in 2 weeks  Consult Cardiology  NPO past midnight for possible intervention  Continue apixaban      Chest pain  Cardiac monitor for arrhthymias  Trend troponin  Aspirin and NTG  Consult cardiology  States that she had a negative stress test, outpatient about a year ago        Hypertension, essential  Chronic medical condition  Monitor  Hold lisinopril for now given elevated creatinine      Decreased GFR  No recent data to compare  Results for ARLENE STEEL (MRN [id number]) as of 11/4/2020 20:58   Ref. Range 7/3/2018 13:08 11/1/2018 18:46 11/4/2020 18:20   BUN Latest Ref Range: 8 - 23 mg/dL 25  22   Creatinine Latest Ref Range: 0.5 - 1.4 mg/dL 0.98 (H) 1.0 1.7 (H)   BUN/CREAT RATIO Latest Ref Range: 6 - 22 (calc) 26 (H)     eGFR if non African American Latest Ref Range: >60 mL/min/1.73 m^2 58 (L) 56 (A) 29.3 (A)   Hold lisinopril  Gentle hydration  No NSAIDs  Repeat chemistry in a.m.      Hyperlipidemia  Chronic medical condition  Continue statins      Anxiety and depression  Chronic medical condition  Continue home medication      VTE Risk Mitigation (From admission, onward)         Ordered     IP VTE HIGH RISK PATIENT  Once      11/04/20 2040     Place sequential compression device  Until discontinued      11/04/20 2040                   ANGIE Solomon  Department of  [facility]   [facility]

## 2020-11-06 DIAGNOSIS — Z12.11 COLON CANCER SCREENING: ICD-10-CM

## 2021-01-21 ENCOUNTER — HOSPITAL ENCOUNTER (OUTPATIENT)
Dept: RADIOLOGY | Facility: HOSPITAL | Age: 76
Discharge: HOME | End: 2021-01-21
Attending: SPECIALIST
Payer: MEDICARE

## 2021-01-21 ENCOUNTER — HOSPITAL ENCOUNTER (OUTPATIENT)
Dept: PREADMISSION TESTING | Facility: HOSPITAL | Age: 76
Discharge: HOME | End: 2021-01-21
Attending: SPECIALIST
Payer: MEDICARE

## 2021-01-21 DIAGNOSIS — Z01.818 PREPROCEDURAL EXAMINATION: Primary | ICD-10-CM

## 2021-01-21 DIAGNOSIS — Z01.810 PREPROCEDURAL CARDIOVASCULAR EXAMINATION: ICD-10-CM

## 2021-01-21 DIAGNOSIS — I47.20 VENTRICULAR TACHYCARDIA: ICD-10-CM

## 2021-01-21 DIAGNOSIS — I47.20 VENTRICULAR TACHYCARDIA: Primary | ICD-10-CM

## 2021-01-21 LAB
ALBUMIN SERPL BCP-MCNC: 4.6 G/DL (ref 3.5–5.2)
ALP SERPL-CCNC: 43 U/L (ref 55–135)
ALT SERPL W/O P-5'-P-CCNC: 13 U/L (ref 10–44)
ANION GAP SERPL CALC-SCNC: 10 MMOL/L (ref 8–16)
APTT PPP: 36.4 SEC (ref 23.6–33.3)
AST SERPL-CCNC: 21 U/L (ref 10–40)
BASOPHILS # BLD AUTO: 0.05 K/UL (ref 0–0.2)
BASOPHILS NFR BLD: 0.5 % (ref 0–1.9)
BILIRUB SERPL-MCNC: 0.8 MG/DL (ref 0.1–1)
BUN SERPL-MCNC: 21 MG/DL (ref 8–23)
CALCIUM SERPL-MCNC: 10 MG/DL (ref 8.7–10.5)
CHLORIDE SERPL-SCNC: 99 MMOL/L (ref 95–110)
CO2 SERPL-SCNC: 28 MMOL/L (ref 23–29)
CREAT SERPL-MCNC: 1.2 MG/DL (ref 0.5–1.4)
DIFFERENTIAL METHOD: ABNORMAL
EOSINOPHIL # BLD AUTO: 0.1 K/UL (ref 0–0.5)
EOSINOPHIL NFR BLD: 0.6 % (ref 0–8)
ERYTHROCYTE [DISTWIDTH] IN BLOOD BY AUTOMATED COUNT: 14.3 % (ref 11.5–14.5)
EST. GFR  (AFRICAN AMERICAN): 51.4 ML/MIN/1.73 M^2
EST. GFR  (NON AFRICAN AMERICAN): 44.6 ML/MIN/1.73 M^2
GLUCOSE SERPL-MCNC: 131 MG/DL (ref 70–110)
HCT VFR BLD AUTO: 42.9 % (ref 37–48.5)
HGB BLD-MCNC: 13.7 G/DL (ref 12–16)
IMM GRANULOCYTES # BLD AUTO: 0.03 K/UL (ref 0–0.04)
IMM GRANULOCYTES NFR BLD AUTO: 0.3 % (ref 0–0.5)
INR PPP: 1.7
LYMPHOCYTES # BLD AUTO: 5.7 K/UL (ref 1–4.8)
LYMPHOCYTES NFR BLD: 52.1 % (ref 18–48)
MAGNESIUM SERPL-MCNC: 2.1 MG/DL (ref 1.6–2.6)
MCH RBC QN AUTO: 29.7 PG (ref 27–31)
MCHC RBC AUTO-ENTMCNC: 31.9 G/DL (ref 32–36)
MCV RBC AUTO: 93 FL (ref 82–98)
MONOCYTES # BLD AUTO: 0.4 K/UL (ref 0.3–1)
MONOCYTES NFR BLD: 3.3 % (ref 4–15)
NEUTROPHILS # BLD AUTO: 4.7 K/UL (ref 1.8–7.7)
NEUTROPHILS NFR BLD: 43.2 % (ref 38–73)
NRBC BLD-RTO: 0 /100 WBC
PLATELET # BLD AUTO: 288 K/UL (ref 150–350)
PMV BLD AUTO: 10.2 FL (ref 9.2–12.9)
POTASSIUM SERPL-SCNC: 4.5 MMOL/L (ref 3.5–5.1)
PROT SERPL-MCNC: 7.6 G/DL (ref 6–8.4)
PROTHROMBIN TIME: 19 SEC (ref 10.6–14.8)
RBC # BLD AUTO: 4.62 M/UL (ref 4–5.4)
SODIUM SERPL-SCNC: 137 MMOL/L (ref 136–145)
WBC # BLD AUTO: 10.96 K/UL (ref 3.9–12.7)

## 2021-01-21 PROCEDURE — 71046 X-RAY EXAM CHEST 2 VIEWS: CPT | Mod: TC

## 2021-01-21 PROCEDURE — 36415 COLL VENOUS BLD VENIPUNCTURE: CPT

## 2021-01-21 PROCEDURE — 85730 THROMBOPLASTIN TIME PARTIAL: CPT

## 2021-01-21 PROCEDURE — 80053 COMPREHEN METABOLIC PANEL: CPT

## 2021-01-21 PROCEDURE — 83735 ASSAY OF MAGNESIUM: CPT

## 2021-01-21 PROCEDURE — 85025 COMPLETE CBC W/AUTO DIFF WBC: CPT

## 2021-01-21 PROCEDURE — 85610 PROTHROMBIN TIME: CPT

## 2021-01-21 RX ORDER — SODIUM CHLORIDE 9 MG/ML
INJECTION, SOLUTION INTRAVENOUS CONTINUOUS
Status: CANCELLED | OUTPATIENT
Start: 2021-01-21

## 2021-01-23 ENCOUNTER — LAB VISIT (OUTPATIENT)
Dept: PRIMARY CARE CLINIC | Facility: CLINIC | Age: 76
End: 2021-01-23
Payer: MEDICARE

## 2021-01-23 DIAGNOSIS — I47.20 VENTRICULAR TACHYCARDIA: ICD-10-CM

## 2021-01-23 PROCEDURE — U0003 INFECTIOUS AGENT DETECTION BY NUCLEIC ACID (DNA OR RNA); SEVERE ACUTE RESPIRATORY SYNDROME CORONAVIRUS 2 (SARS-COV-2) (CORONAVIRUS DISEASE [COVID-19]), AMPLIFIED PROBE TECHNIQUE, MAKING USE OF HIGH THROUGHPUT TECHNOLOGIES AS DESCRIBED BY CMS-2020-01-R: HCPCS

## 2021-01-24 LAB — SARS-COV-2 RNA RESP QL NAA+PROBE: NOT DETECTED

## 2021-01-25 ENCOUNTER — HOSPITAL ENCOUNTER (OUTPATIENT)
Facility: HOSPITAL | Age: 76
Discharge: HOME | End: 2021-01-25
Attending: INTERNAL MEDICINE | Admitting: INTERNAL MEDICINE
Payer: MEDICARE

## 2021-01-25 VITALS
DIASTOLIC BLOOD PRESSURE: 51 MMHG | SYSTOLIC BLOOD PRESSURE: 103 MMHG | HEART RATE: 80 BPM | OXYGEN SATURATION: 97 % | RESPIRATION RATE: 13 BRPM

## 2021-01-25 DIAGNOSIS — Z01.818 PREPROCEDURAL EXAMINATION: ICD-10-CM

## 2021-01-25 DIAGNOSIS — I47.20 VENTRICULAR TACHYCARDIA: ICD-10-CM

## 2021-01-25 DIAGNOSIS — Z01.810 PREPROCEDURAL CARDIOVASCULAR EXAMINATION: ICD-10-CM

## 2021-01-25 LAB — CATH EF QUANTITATIVE: 60 %

## 2021-01-25 PROCEDURE — 25500020 PHARM REV CODE 255: Performed by: SPECIALIST

## 2021-01-25 PROCEDURE — 93458 L HRT ARTERY/VENTRICLE ANGIO: CPT

## 2021-01-25 PROCEDURE — 25000003 PHARM REV CODE 250: Performed by: SPECIALIST

## 2021-01-25 PROCEDURE — C1887 CATHETER, GUIDING: HCPCS | Performed by: SPECIALIST

## 2021-01-25 PROCEDURE — C1769 GUIDE WIRE: HCPCS | Performed by: SPECIALIST

## 2021-01-25 PROCEDURE — 99153 MOD SED SAME PHYS/QHP EA: CPT | Performed by: SPECIALIST

## 2021-01-25 PROCEDURE — 63600175 PHARM REV CODE 636 W HCPCS: Performed by: SPECIALIST

## 2021-01-25 PROCEDURE — C1760 CLOSURE DEV, VASC: HCPCS | Performed by: SPECIALIST

## 2021-01-25 PROCEDURE — 99152 MOD SED SAME PHYS/QHP 5/>YRS: CPT | Performed by: SPECIALIST

## 2021-01-25 PROCEDURE — C1894 INTRO/SHEATH, NON-LASER: HCPCS | Performed by: SPECIALIST

## 2021-01-25 PROCEDURE — C1725 CATH, TRANSLUMIN NON-LASER: HCPCS | Performed by: SPECIALIST

## 2021-01-25 PROCEDURE — 27201423 OPTIME MED/SURG SUP & DEVICES STERILE SUPPLY: Performed by: SPECIALIST

## 2021-01-25 PROCEDURE — 25000003 PHARM REV CODE 250: Performed by: PHYSICIAN ASSISTANT

## 2021-01-25 DEVICE — DEVICE CLOSURE  ANGIO-SEAL 6FR 610130: Type: IMPLANTABLE DEVICE | Site: GROIN | Status: FUNCTIONAL

## 2021-01-25 RX ORDER — SODIUM CHLORIDE 450 MG/100ML
INJECTION, SOLUTION INTRAVENOUS CONTINUOUS
Status: DISCONTINUED | OUTPATIENT
Start: 2021-01-25 | End: 2021-01-25 | Stop reason: HOSPADM

## 2021-01-25 RX ORDER — ACETAMINOPHEN 325 MG/1
650 TABLET ORAL EVERY 4 HOURS PRN
Status: DISCONTINUED | OUTPATIENT
Start: 2021-01-25 | End: 2021-01-25 | Stop reason: HOSPADM

## 2021-01-25 RX ORDER — NITROGLYCERIN 5 MG/ML
INJECTION, SOLUTION INTRAVENOUS
Status: DISCONTINUED | OUTPATIENT
Start: 2021-01-25 | End: 2021-01-25 | Stop reason: HOSPADM

## 2021-01-25 RX ORDER — ONDANSETRON 4 MG/1
8 TABLET, ORALLY DISINTEGRATING ORAL EVERY 8 HOURS PRN
Status: DISCONTINUED | OUTPATIENT
Start: 2021-01-25 | End: 2021-01-25 | Stop reason: HOSPADM

## 2021-01-25 RX ORDER — LIDOCAINE HYDROCHLORIDE 10 MG/ML
INJECTION, SOLUTION EPIDURAL; INFILTRATION; INTRACAUDAL; PERINEURAL
Status: DISCONTINUED | OUTPATIENT
Start: 2021-01-25 | End: 2021-01-25 | Stop reason: HOSPADM

## 2021-01-25 RX ORDER — SODIUM CHLORIDE 9 MG/ML
INJECTION, SOLUTION INTRAVENOUS CONTINUOUS
Status: DISCONTINUED | OUTPATIENT
Start: 2021-01-25 | End: 2021-01-25 | Stop reason: HOSPADM

## 2021-01-25 RX ORDER — SERTRALINE HYDROCHLORIDE 50 MG/1
50 TABLET, FILM COATED ORAL DAILY
COMMUNITY

## 2021-01-25 RX ORDER — FENTANYL CITRATE 50 UG/ML
INJECTION, SOLUTION INTRAMUSCULAR; INTRAVENOUS
Status: DISCONTINUED | OUTPATIENT
Start: 2021-01-25 | End: 2021-01-25 | Stop reason: HOSPADM

## 2021-01-25 RX ORDER — MIDAZOLAM HYDROCHLORIDE 1 MG/ML
INJECTION INTRAMUSCULAR; INTRAVENOUS
Status: DISCONTINUED | OUTPATIENT
Start: 2021-01-25 | End: 2021-01-25 | Stop reason: HOSPADM

## 2021-01-25 RX ORDER — VERAPAMIL HYDROCHLORIDE 2.5 MG/ML
INJECTION, SOLUTION INTRAVENOUS
Status: DISCONTINUED | OUTPATIENT
Start: 2021-01-25 | End: 2021-01-25 | Stop reason: HOSPADM

## 2021-01-25 RX ORDER — IODIXANOL 320 MG/ML
INJECTION, SOLUTION INTRAVASCULAR
Status: DISCONTINUED | OUTPATIENT
Start: 2021-01-25 | End: 2021-01-25 | Stop reason: HOSPADM

## 2021-01-25 RX ORDER — METOPROLOL SUCCINATE 25 MG/1
25 TABLET, EXTENDED RELEASE ORAL DAILY
COMMUNITY

## 2021-01-25 RX ADMIN — SODIUM CHLORIDE: 0.45 INJECTION, SOLUTION INTRAVENOUS at 10:01

## 2021-01-25 RX ADMIN — SODIUM CHLORIDE: 0.9 INJECTION, SOLUTION INTRAVENOUS at 08:01

## 2021-07-26 ENCOUNTER — TELEPHONE (OUTPATIENT)
Dept: FAMILY MEDICINE | Facility: CLINIC | Age: 76
End: 2021-07-26

## 2021-11-11 DIAGNOSIS — Z12.11 COLON CANCER SCREENING: ICD-10-CM

## 2022-08-12 ENCOUNTER — TELEPHONE (OUTPATIENT)
Dept: FAMILY MEDICINE | Facility: CLINIC | Age: 77
End: 2022-08-12
Payer: MEDICARE

## 2022-12-07 ENCOUNTER — PES CALL (OUTPATIENT)
Dept: ADMINISTRATIVE | Facility: CLINIC | Age: 77
End: 2022-12-07
Payer: MEDICARE

## 2022-12-11 ENCOUNTER — HOSPITAL ENCOUNTER (EMERGENCY)
Facility: HOSPITAL | Age: 77
Discharge: HOME | End: 2022-12-12
Attending: EMERGENCY MEDICINE
Payer: MEDICARE

## 2022-12-11 DIAGNOSIS — R05.9 COUGH: ICD-10-CM

## 2022-12-11 DIAGNOSIS — R06.02 SOB (SHORTNESS OF BREATH): ICD-10-CM

## 2022-12-11 DIAGNOSIS — J18.9 PNEUMONIA DUE TO INFECTIOUS ORGANISM, UNSPECIFIED LATERALITY, UNSPECIFIED PART OF LUNG: Primary | ICD-10-CM

## 2022-12-11 PROCEDURE — 93010 ELECTROCARDIOGRAM REPORT: CPT | Mod: ,,, | Performed by: INTERNAL MEDICINE

## 2022-12-11 PROCEDURE — 82962 GLUCOSE BLOOD TEST: CPT

## 2022-12-11 PROCEDURE — 93010 EKG 12-LEAD: ICD-10-PCS | Mod: ,,, | Performed by: INTERNAL MEDICINE

## 2022-12-11 PROCEDURE — 83880 ASSAY OF NATRIURETIC PEPTIDE: CPT | Performed by: NURSE PRACTITIONER

## 2022-12-11 PROCEDURE — 80053 COMPREHEN METABOLIC PANEL: CPT | Performed by: NURSE PRACTITIONER

## 2022-12-11 PROCEDURE — 84484 ASSAY OF TROPONIN QUANT: CPT | Performed by: NURSE PRACTITIONER

## 2022-12-11 PROCEDURE — 87502 INFLUENZA DNA AMP PROBE: CPT | Performed by: NURSE PRACTITIONER

## 2022-12-11 PROCEDURE — 25000242 PHARM REV CODE 250 ALT 637 W/ HCPCS: Performed by: NURSE PRACTITIONER

## 2022-12-11 PROCEDURE — 85025 COMPLETE CBC W/AUTO DIFF WBC: CPT | Performed by: NURSE PRACTITIONER

## 2022-12-11 PROCEDURE — 99285 EMERGENCY DEPT VISIT HI MDM: CPT | Mod: 25

## 2022-12-11 PROCEDURE — U0002 COVID-19 LAB TEST NON-CDC: HCPCS | Performed by: NURSE PRACTITIONER

## 2022-12-11 PROCEDURE — 93005 ELECTROCARDIOGRAM TRACING: CPT | Performed by: INTERNAL MEDICINE

## 2022-12-11 PROCEDURE — 96365 THER/PROPH/DIAG IV INF INIT: CPT

## 2022-12-11 RX ORDER — IPRATROPIUM BROMIDE AND ALBUTEROL SULFATE 2.5; .5 MG/3ML; MG/3ML
3 SOLUTION RESPIRATORY (INHALATION)
Status: COMPLETED | OUTPATIENT
Start: 2022-12-11 | End: 2022-12-12

## 2022-12-11 RX ORDER — ESCITALOPRAM OXALATE 20 MG
20 TABLET ORAL DAILY
COMMUNITY
Start: 2022-11-29

## 2022-12-11 RX ORDER — ALPRAZOLAM 1 MG/1
1 TABLET ORAL NIGHTLY
COMMUNITY
Start: 2022-10-03 | End: 2023-01-30

## 2022-12-11 RX ORDER — LISINOPRIL 40 MG/1
40 TABLET ORAL 2 TIMES DAILY
COMMUNITY
Start: 2022-11-29

## 2022-12-11 RX ORDER — SOTALOL HYDROCHLORIDE 80 MG/1
80 TABLET ORAL DAILY PRN
COMMUNITY
Start: 2022-11-29

## 2022-12-11 RX ORDER — BEMPEDOIC ACID AND EZETIMIBE 180; 10 MG/1; MG/1
1 TABLET, FILM COATED ORAL DAILY
COMMUNITY
Start: 2022-11-29

## 2022-12-11 RX ORDER — PANTOPRAZOLE SODIUM 40 MG/1
40 TABLET, DELAYED RELEASE ORAL DAILY
COMMUNITY
Start: 2022-11-29

## 2022-12-11 RX ADMIN — IPRATROPIUM BROMIDE AND ALBUTEROL SULFATE 3 ML: .5; 3 SOLUTION RESPIRATORY (INHALATION) at 11:12

## 2022-12-12 VITALS
WEIGHT: 139 LBS | HEART RATE: 67 BPM | DIASTOLIC BLOOD PRESSURE: 84 MMHG | HEIGHT: 62 IN | RESPIRATION RATE: 16 BRPM | SYSTOLIC BLOOD PRESSURE: 165 MMHG | BODY MASS INDEX: 25.58 KG/M2 | TEMPERATURE: 100 F | OXYGEN SATURATION: 96 %

## 2022-12-12 LAB
ALBUMIN SERPL BCP-MCNC: 4.2 G/DL (ref 3.5–5.2)
ALP SERPL-CCNC: 55 U/L (ref 55–135)
ALT SERPL W/O P-5'-P-CCNC: 16 U/L (ref 10–44)
ANION GAP SERPL CALC-SCNC: 9 MMOL/L (ref 8–16)
AST SERPL-CCNC: 22 U/L (ref 10–40)
BASOPHILS # BLD AUTO: 0.04 K/UL (ref 0–0.2)
BASOPHILS NFR BLD: 0.5 % (ref 0–1.9)
BILIRUB SERPL-MCNC: 0.7 MG/DL (ref 0.1–1)
BNP SERPL-MCNC: 141 PG/ML (ref 0–99)
BUN SERPL-MCNC: 12 MG/DL (ref 8–23)
CALCIUM SERPL-MCNC: 8.8 MG/DL (ref 8.7–10.5)
CHLORIDE SERPL-SCNC: 97 MMOL/L (ref 95–110)
CO2 SERPL-SCNC: 28 MMOL/L (ref 23–29)
CREAT SERPL-MCNC: 1.2 MG/DL (ref 0.5–1.4)
DIFFERENTIAL METHOD: ABNORMAL
EOSINOPHIL # BLD AUTO: 0.1 K/UL (ref 0–0.5)
EOSINOPHIL NFR BLD: 1.1 % (ref 0–8)
ERYTHROCYTE [DISTWIDTH] IN BLOOD BY AUTOMATED COUNT: 15.3 % (ref 11.5–14.5)
EST. GFR  (NO RACE VARIABLE): 46.9 ML/MIN/1.73 M^2
GLUCOSE SERPL-MCNC: 101 MG/DL (ref 70–110)
GLUCOSE SERPL-MCNC: 95 MG/DL (ref 70–110)
HCT VFR BLD AUTO: 41.1 % (ref 37–48.5)
HGB BLD-MCNC: 13.1 G/DL (ref 12–16)
IMM GRANULOCYTES # BLD AUTO: 0.02 K/UL (ref 0–0.04)
IMM GRANULOCYTES NFR BLD AUTO: 0.3 % (ref 0–0.5)
INFLUENZA A, MOLECULAR: NEGATIVE
INFLUENZA B, MOLECULAR: NEGATIVE
LYMPHOCYTES # BLD AUTO: 2.4 K/UL (ref 1–4.8)
LYMPHOCYTES NFR BLD: 32.8 % (ref 18–48)
MCH RBC QN AUTO: 29.2 PG (ref 27–31)
MCHC RBC AUTO-ENTMCNC: 31.9 G/DL (ref 32–36)
MCV RBC AUTO: 92 FL (ref 82–98)
MONOCYTES # BLD AUTO: 0.5 K/UL (ref 0.3–1)
MONOCYTES NFR BLD: 7.3 % (ref 4–15)
NEUTROPHILS # BLD AUTO: 4.3 K/UL (ref 1.8–7.7)
NEUTROPHILS NFR BLD: 58 % (ref 38–73)
NRBC BLD-RTO: 0 /100 WBC
PLATELET # BLD AUTO: 208 K/UL (ref 150–450)
PMV BLD AUTO: 10.4 FL (ref 9.2–12.9)
POTASSIUM SERPL-SCNC: 3.6 MMOL/L (ref 3.5–5.1)
PROT SERPL-MCNC: 7.2 G/DL (ref 6–8.4)
RBC # BLD AUTO: 4.49 M/UL (ref 4–5.4)
SARS-COV-2 RDRP RESP QL NAA+PROBE: NEGATIVE
SODIUM SERPL-SCNC: 134 MMOL/L (ref 136–145)
SPECIMEN SOURCE: NORMAL
TROPONIN I SERPL HS-MCNC: 7 PG/ML (ref 0–14.9)
TROPONIN I SERPL HS-MCNC: 7.2 PG/ML (ref 0–14.9)
WBC # BLD AUTO: 7.41 K/UL (ref 3.9–12.7)

## 2022-12-12 PROCEDURE — 25500020 PHARM REV CODE 255: Performed by: EMERGENCY MEDICINE

## 2022-12-12 PROCEDURE — 94640 AIRWAY INHALATION TREATMENT: CPT

## 2022-12-12 PROCEDURE — 99900031 HC PATIENT EDUCATION (STAT)

## 2022-12-12 PROCEDURE — 63700000 PHARM REV CODE 250 ALT 637 W/O HCPCS: Performed by: EMERGENCY MEDICINE

## 2022-12-12 PROCEDURE — 63600175 PHARM REV CODE 636 W HCPCS: Performed by: EMERGENCY MEDICINE

## 2022-12-12 PROCEDURE — 25000242 PHARM REV CODE 250 ALT 637 W/ HCPCS: Performed by: NURSE PRACTITIONER

## 2022-12-12 PROCEDURE — 94761 N-INVAS EAR/PLS OXIMETRY MLT: CPT

## 2022-12-12 PROCEDURE — 84484 ASSAY OF TROPONIN QUANT: CPT | Performed by: EMERGENCY MEDICINE

## 2022-12-12 RX ORDER — AZITHROMYCIN 250 MG/1
500 TABLET, FILM COATED ORAL
Status: COMPLETED | OUTPATIENT
Start: 2022-12-12 | End: 2022-12-12

## 2022-12-12 RX ORDER — DOXYCYCLINE 100 MG/1
100 CAPSULE ORAL 2 TIMES DAILY
Qty: 20 CAPSULE | Refills: 0 | Status: SHIPPED | OUTPATIENT
Start: 2022-12-12 | End: 2022-12-22

## 2022-12-12 RX ADMIN — IOHEXOL 100 ML: 350 INJECTION, SOLUTION INTRAVENOUS at 12:12

## 2022-12-12 RX ADMIN — AZITHROMYCIN 500 MG: 250 TABLET, FILM COATED ORAL at 03:12

## 2022-12-12 RX ADMIN — CEFTRIAXONE 1 G: 1 INJECTION, SOLUTION INTRAVENOUS at 03:12

## 2022-12-12 RX ADMIN — IPRATROPIUM BROMIDE AND ALBUTEROL SULFATE 3 ML: .5; 3 SOLUTION RESPIRATORY (INHALATION) at 12:12

## 2022-12-12 NOTE — ED PROVIDER NOTES
Encounter Date: 12/11/2022       History     Chief Complaint   Patient presents with    Shortness of Breath     77-year-old female presents complaining of cough and congestion, shortness of breath patient reports that she has sick contacts at home with similar symptoms and believes she is coming down with something.  Patient has a history of atrial fibrillation, hypertension, CVA patient denies abdominal pain nausea or vomiting patient reports chest discomfort but it is related to her cough she reports no other chest discomfort.  Patient has no other acute complaints at this time.    Review of patient's allergies indicates:   Allergen Reactions    Keppra [levetiracetam]     Statins-hmg-coa reductase inhibitors      Abdominal pain.       Past Medical History:   Diagnosis Date    Abnormal Pap smear of cervix     Anticoagulant long-term use     Arthritis     Atrial fibrillation     Digestive disorder     Hypertension     Renal disorder     Seizures     Stroke      Past Surgical History:   Procedure Laterality Date    ABDOMINAL SURGERY      ANGIOGRAM, CORONARY, WITH LEFT HEART CATHETERIZATION Left 1/25/2021    Procedure: ANGIOGRAM,CORONARY,WITH LEFT HEART CATHETERIZATION;  Surgeon: Lui Butler MD;  Location: [facility] CATH/EP LAB;  Service: Cardiology;  Laterality: Left;    breast implants  2001    BREAST SURGERY Bilateral 2001    implants    CARDIAC SURGERY      COSMETIC SURGERY      EYE SURGERY      KIDNEY STONE SURGERY      TOE SURGERY      TUBAL LIGATION       Family History   Problem Relation Age of Onset    Heart disease Mother     Stroke Mother     Heart disease Father     Cancer Sister      Social History     Tobacco Use    Smoking status: Never    Smokeless tobacco: Never   Substance Use Topics    Alcohol use: Never    Drug use: Never     Review of Systems   Constitutional:  Negative for fever.   HENT:  Positive for congestion. Negative for rhinorrhea, sore throat and trouble swallowing.    Eyes:  Negative for  visual disturbance.   Respiratory:  Positive for cough and shortness of breath. Negative for chest tightness and wheezing.    Cardiovascular:  Negative for palpitations and leg swelling.   Gastrointestinal:  Negative for abdominal distention, abdominal pain, constipation, diarrhea, nausea and vomiting.   Genitourinary:  Negative for difficulty urinating, dysuria, flank pain and frequency.   Musculoskeletal:  Negative for arthralgias, back pain, joint swelling and neck pain.   Skin:  Negative for color change and rash.   Neurological:  Negative for dizziness, syncope, speech difficulty, weakness, numbness and headaches.   All other systems reviewed and are negative.    Physical Exam     Initial Vitals [12/11/22 2316]   BP Pulse Resp Temp SpO2   (!) 193/101 65 19 99.5 °F (37.5 °C) (!) 93 %      MAP       --         Physical Exam    Nursing note and vitals reviewed.  Constitutional: She appears well-developed and well-nourished. She is not diaphoretic. No distress.   HENT:   Head: Normocephalic and atraumatic.   Right Ear: External ear normal.   Left Ear: External ear normal.   Nose: Nose normal.   Mouth/Throat: Oropharynx is clear and moist. No oropharyngeal exudate.   Eyes: Conjunctivae and EOM are normal. Pupils are equal, round, and reactive to light. Right eye exhibits no discharge. Left eye exhibits no discharge. No scleral icterus.   Neck: Neck supple. No thyromegaly present. No tracheal deviation present. No JVD present.   Normal range of motion.  Cardiovascular:  Normal rate, regular rhythm, normal heart sounds and intact distal pulses.     Exam reveals no gallop and no friction rub.       No murmur heard.  Pulmonary/Chest: Breath sounds normal. No stridor. No respiratory distress. She has no wheezes. She has no rhonchi. She has no rales. She exhibits no tenderness.   Abdominal: Abdomen is soft. Bowel sounds are normal. She exhibits no distension and no mass. There is no abdominal tenderness. There is no  rebound and no guarding.   Musculoskeletal:         General: No tenderness or edema. Normal range of motion.      Cervical back: Normal range of motion and neck supple.     Lymphadenopathy:     She has no cervical adenopathy.   Neurological: She is alert and oriented to person, place, and time. She has normal strength. No cranial nerve deficit or sensory deficit.   Skin: Skin is warm and dry. No rash and no abscess noted. No erythema. No pallor.       ED Course   Procedures  Labs Reviewed   B-TYPE NATRIURETIC PEPTIDE - Abnormal; Notable for the following components:       Result Value     (*)     All other components within normal limits   CBC W/ AUTO DIFFERENTIAL - Abnormal; Notable for the following components:    MCHC 31.9 (*)     RDW 15.3 (*)     All other components within normal limits   COMPREHENSIVE METABOLIC PANEL - Abnormal; Notable for the following components:    Sodium 134 (*)     eGFR 46.9 (*)     All other components within normal limits   TROPONIN I HIGH SENSITIVITY   INFLUENZA A AND B ANTIGEN    Narrative:     Specimen Source->Nasopharyngeal Swab   SARS-COV-2 RNA AMPLIFICATION, QUAL   TROPONIN I HIGH SENSITIVITY   POCT GLUCOSE          Imaging Results              CTA Chest Non-Coronary (PE Studies) (Final result)  Result time 12/12/22 02:28:13      Final result by Jose Rubio MD (12/12/22 02:28:13)                   Narrative:    EXAM DESCRIPTION: CTA CHEST NON CORONARY (PE STUDIES) 12/12/2022 2:22 AM CST    CLINICAL HISTORY: 77 years, Female, Pulmonary embolism (PE) suspected, high prob    COMPARISON: 5/27 9/17.    PROCEDURE:  Multiple transaxial tomograms of the chest were obtained from the lung apices through the lung bases utilizing 2 mm slice thickness at 2 mm interval reconstruction after the administration of large bolus of IV contrast for complete opacification of the pulmonary arteries.  Subsequent 3-D maximum intensity projection images were generated in the coronal and  sagittal plane for review.  This exam was performed according to our departmental dose-optimization protocol, which includes automated exposure control, adjustment of the mA and/or kV according to patient size and/or use of iterative reconstruction technique.    FINDINGS:  The lungs parenchyma demonstrate minimal dependent atelectatic changes and haziness throughout the lung parenchyma suggesting the possibility of air trapping. Minimal patchy area of airspace opacity posterior segment right lower lobe with bronchial thickening on axial image 151-183 could correspond to bronchopneumonia. No masses/or nodules are identified.  The trachea mainstem bronchus demonstrate to be normal. There is no significant pericardial or pleural effusions.  The thoracic aorta demonstrate minimal intimal calcification. The heart is normal in size. No evidence for right ventricular strain. Minimal coronary artery calcifications. There is a dual-lead pacemaker in place. There is a implantable loop recorder within the anterior left chest.  There is a prominent right hilar lymph node measuring 11 mm on axial image 109. No other significant prominent lymph nodes are demonstrated. The axillary regions demonstrate to be clear.  Pulmonary arteries demonstrate to be normal, no intraluminal defect are seen that would suggest pulmonary embolus.  The bone windows demonstrate no significant skeletal lesions.  Bilateral breast implant  The visualized portions of the upper abdomen demonstrate status post cholecystectomy. There is a cyst posterior segment right hepatic lobe measuring 1.7 cm on image 246. Exophytic simple renal cyst upper pole left kidney measuring 1.8 cm on image 261.    IMPRESSION:  No CT evidence of pulmonary embolus.    Minimal dependent atelectatic changes and haziness throughout the lung parenchyma suggesting the possibility of air trapping.    Minimal patchy area of airspace opacity posterior segment right lower lobe with  bronchial thickening could correspond to bronchopneumonia.    Prominent right hilar lymph node most likely reactive in nature.    Pacemaker in place.    Status post cholecystectomy.    Hepatic and left renal cysts.    Electronically signed by:  Jose Rubio MD  12/12/2022 2:28 AM CST Workstation: [id number]                                     X-Ray Chest AP Portable (In process)    Procedure changed from X-Ray Chest 1 View                    Medications   cefTRIAXone (ROCEPHIN) 1 g/50 mL D5W IVPB (1 g Intravenous New Bag 12/12/22 0300)   albuterol-ipratropium 2.5 mg-0.5 mg/3 mL nebulizer solution 3 mL (3 mLs Nebulization Given 12/12/22 0000)   iohexoL (OMNIPAQUE 350) injection 100 mL (100 mLs Intravenous Given 12/12/22 0058)   azithromycin tablet 500 mg (500 mg Oral Given 12/12/22 0300)     Medical Decision Making:   History:   Old Medical Records: I decided to obtain old medical records.  Initial Assessment:   Emergent evaluation of a 77-year-old female presenting with cough congestion shortness of breath differential diagnosis includes URI, pneumonia, PE, influenza, COVID-19          Attending Attestation:             Attending ED Notes:   Patient's labs show no significant acute abnormalities, patient imaging shows mild bronchopneumonia, patient oxygen saturations have remained about 94% and patient has no desaturation on ambulation.  Patient offered admission to hospital given her age and pneumonia however patient declined stating that she would like to try antibiotics at home and follow-up with her PCP.  Patient given a dose of IV antibiotics in the emergency department and will be prescribed a course of antibiotics patient is advised to return immediately to the ER for any worsening or for any further concerns or if she changes her mind about admission patient otherwise to follow up with PCP in the next 2-3 days for re-evaluation and further management patient is cautioned to return immediately to the ER for  any worsening or for any further concerns.    I had a detailed discussion with the patient and/or guardian regarding: The historical points, exam findings, and diagnostic results supporting the discharge diagnosis, lab results, pertinent radiology results, and the need for outpatient follow-up, for definitive care with a family practitioner and to return to the emergency department if symptoms worsen or persist or if there are any questions or concerns that arise at home. All questions have been answered in detail. Strict return to Emergency Department precautions have been provided.     A dictation software program was used for this note.  Please expect some simple typographical  errors in this note.                        Clinical Impression:   Final diagnoses:  [R06.02] SOB (shortness of breath)  [R05.9] Cough  [J18.9] Pneumonia due to infectious organism, unspecified laterality, unspecified part of lung (Primary)        ED Disposition Condition    Discharge Stable          ED Prescriptions       Medication Sig Dispense Start Date End Date Auth. Provider    doxycycline (VIBRAMYCIN) 100 MG Cap Take 1 capsule (100 mg total) by mouth 2 (two) times daily. for 10 days 20 capsule 12/12/2022 12/22/2022 Patric Brooks MD          Follow-up Information       Follow up With Specialties Details Why Contact Info Additional Information    [facility] - Emergency Dept Emergency Medicine  If symptoms worsen [address] [facility] [id number] [phone] 1st floor    Juan M Lora MD Family Medicine, Internal Medicine Schedule an appointment as soon as possible for a visit in 2 days  [address]  [place] [address]  [phone]                Patric Brooks MD  12/12/22 6816

## 2022-12-12 NOTE — FIRST PROVIDER EVALUATION
Medical screening examination initiated.  I have conducted a focused provider triage encounter, findings are as follows:    Brief history of present illness:  77 year old female with history HTN, a fib, has pacemaker, presents to the ER tonight with increasing SOB, harsh cough, some mild CP and feeling chills, subjective fever. Onset was yesterday. Hard to breath. Was around sick grandchildren this past week and cough started yesterday. SOB with mild exertion. No hx of asthma or COPD.     There were no vitals filed for this visit.    Pertinent physical exam:  harsh cough, moderate wheezing and rhonchi. Elevated BP.     Brief workup plan: see orders placed.     Preliminary workup initiated; this workup will be continued and followed by the physician or advanced practice provider that is assigned to the patient when roomed.

## 2023-01-30 ENCOUNTER — OFFICE VISIT (OUTPATIENT)
Dept: FAMILY MEDICINE | Facility: CLINIC | Age: 78
End: 2023-01-30
Payer: MEDICARE

## 2023-01-30 VITALS
WEIGHT: 135.13 LBS | HEIGHT: 62 IN | BODY MASS INDEX: 24.87 KG/M2 | HEART RATE: 73 BPM | OXYGEN SATURATION: 99 % | SYSTOLIC BLOOD PRESSURE: 132 MMHG | TEMPERATURE: 98 F | DIASTOLIC BLOOD PRESSURE: 80 MMHG

## 2023-01-30 DIAGNOSIS — F41.9 ANXIETY AND DEPRESSION: Primary | ICD-10-CM

## 2023-01-30 DIAGNOSIS — F32.A ANXIETY AND DEPRESSION: Primary | ICD-10-CM

## 2023-01-30 DIAGNOSIS — Z79.899 CHRONIC USE OF BENZODIAZEPINE FOR THERAPEUTIC PURPOSE: ICD-10-CM

## 2023-01-30 LAB
AMP AMPHETAMINE 1000 NM/ML POC: NEGATIVE
BAR BARBITURATES 300 NG/ML POC: NEGATIVE
BUP BUPRENORPHINE 10 NG/ML POC: NEGATIVE
BZO BENZODIAZEPINES 300 NG/ML POC: ABNORMAL
COC COCAINE 300 NG/ML POC: NEGATIVE
CREATININE (CR) POC: 50
CTP QC/QA: YES
MET METHAMPHETAMINE 1000 NG/ML POC: NEGATIVE
MOP/OPI300 MORPHINE 300 NG/ML POC: NEGATIVE
MTD METHADONE 300 NG/ML POC: NEGATIVE
OXIDANT (OX) POC: NEGATIVE
OXY OXYCODONE 100 NG/ML POC: NEGATIVE
SPECIFIC GRAVITY (SG) POC: 1.02
TEMPERATURE (°F) POC: 92
THC MARIJUANA 50 NG/ML POC: NEGATIVE

## 2023-01-30 PROCEDURE — 99213 PR OFFICE/OUTPT VISIT, EST, LEVL III, 20-29 MIN: ICD-10-PCS | Mod: ,,, | Performed by: FAMILY MEDICINE

## 2023-01-30 PROCEDURE — 99213 OFFICE O/P EST LOW 20 MIN: CPT | Mod: ,,, | Performed by: FAMILY MEDICINE

## 2023-01-30 PROCEDURE — 80305 DRUG TEST PRSMV DIR OPT OBS: CPT | Mod: QW,RHCUB | Performed by: FAMILY MEDICINE

## 2023-01-30 RX ORDER — ALPRAZOLAM 0.5 MG/1
0.5 TABLET ORAL 2 TIMES DAILY PRN
Qty: 60 TABLET | Refills: 2 | Status: SHIPPED | OUTPATIENT
Start: 2023-01-30 | End: 2023-03-01

## 2023-01-30 NOTE — PROGRESS NOTES
Subjective:       Patient ID: Madeline Leavitt is a 77 y.o. female.    Chief Complaint: Establish Care, Anxiety, and Depression (Pt here to est.lis,was seeing Dr Lora)    Ms Leavitt is here to establish care. She has A-fib, HTN, Hyperlipidemia, renal stones and a decreased GFR, and anxiety and depression. She had pneumonia in December 2022, was seen in the ER but was not admitted. She was found to have a low GFR and a BNP of 151 while in the hospital. She has no specific complaints today except for insomnia. She has never had a sleep study. She states she has tried lots of different meds, including alprazolam, but none worked long term. She has not been prescribed alprazolam since October 2022. Her cardiologist had been writing it but she has been weaned off of it, and has not had it prescribed since October 2022.   She states she is extremely anxious.      Anxiety  Symptoms include nervous/anxious behavior.       DepressionPatient presents with the following symptoms: nervousness/anxiety.      Review of Systems   Constitutional:  Positive for fatigue.        Fatigue from insomnia   HENT: Negative.     Eyes: Negative.    Respiratory: Negative.     Cardiovascular: Negative.    Gastrointestinal: Negative.    Endocrine: Negative.    Genitourinary: Negative.    Musculoskeletal: Negative.    Skin: Negative.    Allergic/Immunologic: Negative.    Neurological: Negative.    Hematological: Negative.    Psychiatric/Behavioral:  Positive for depression and sleep disturbance. The patient is nervous/anxious.      Patient Active Problem List   Diagnosis    Encounter for well woman exam with routine gynecological exam    Pelvic mass in female    Anxiety and depression    Hypertension, essential    Cold intolerance    Hyperlipidemia    Bradycardia    Insomnia    Atrial fibrillation    Decreased GFR    Preprocedural examination       Objective:      Physical Exam  Constitutional:       Appearance: Normal appearance. She is obese.  "  HENT:      Head: Normocephalic and atraumatic.      Mouth/Throat:      Mouth: Mucous membranes are dry.   Eyes:      Extraocular Movements: Extraocular movements intact.      Pupils: Pupils are equal, round, and reactive to light.   Cardiovascular:      Rate and Rhythm: Normal rate and regular rhythm.   Pulmonary:      Effort: Pulmonary effort is normal.      Breath sounds: Normal breath sounds.   Abdominal:      Palpations: Abdomen is soft.   Musculoskeletal:         General: Normal range of motion.      Cervical back: Normal range of motion and neck supple.   Skin:     General: Skin is warm and dry.   Neurological:      General: No focal deficit present.      Mental Status: She is alert and oriented to person, place, and time.   Psychiatric:         Mood and Affect: Mood normal.         Behavior: Behavior normal.       Lab Results   Component Value Date    WBC 7.41 12/11/2022    HGB 13.1 12/11/2022    HCT 41.1 12/11/2022     12/11/2022    ALT 16 12/11/2022    AST 22 12/11/2022     (L) 12/11/2022    K 3.6 12/11/2022    CL 97 12/11/2022    CREATININE 1.2 12/11/2022    BUN 12 12/11/2022    CO2 28 12/11/2022    INR 1.7 01/21/2021     The ASCVD Risk score (Gage DK, et al., 2019) failed to calculate for the following reasons:    Cannot find a previous HDL lab    Cannot find a previous total cholesterol lab  Visit Vitals  /80 (BP Location: Left arm, Patient Position: Sitting, BP Method: Medium (Manual))   Pulse 73   Temp 98.2 °F (36.8 °C)   Ht 5' 2" (1.575 m)   Wt 61.3 kg (135 lb 2.3 oz)   SpO2 99%   BMI 24.72 kg/m²      Assessment:       1. Anxiety and depression    2. Chronic use of benzodiazepine for therapeutic purpose        Plan:       1. Anxiety and depression  -     ALPRAZolam (XANAX) 0.5 MG tablet; Take 1 tablet (0.5 mg total) by mouth 2 (two) times daily as needed for Anxiety.  Dispense: 60 tablet; Refill: 2  -     POCT Urine Drug Screen (With BUP)    2. Chronic use of benzodiazepine for " therapeutic purpose  -     POCT Urine Drug Screen (With BUP)       Follow up in about 3 months (around 4/30/2023), or if symptoms worsen or fail to improve.

## 2023-01-31 ENCOUNTER — TELEPHONE (OUTPATIENT)
Dept: FAMILY MEDICINE | Facility: CLINIC | Age: 78
End: 2023-01-31
Payer: MEDICARE

## 2023-01-31 NOTE — TELEPHONE ENCOUNTER
Belinda Vigil MD   1/30/2023 12:56 PM CST Back to Top      Patient stated she has not had xanax prescribed since october (confirmed by PDMP) and has been out of the medications, Her UDS is (+) for benzodiazapines, so I will be unable to write any more benzodiazapines Please call and cancel her refills for the xanax     Called pharmacy - relayed to pharmacy staff that provider wishes for the alprazolam prescription to be cancelled. Placed on extended hold after relaying this information to staff... after 16 minutes on hold patient care technician answered, then the call was dropped shortly after without being able to further discuss prescription.      Called pharmacy once again - they advise the medication has already shipped out to patient but that they can cancel all future refills. All future refills from our office have been successfully cancelled.     Called patient to discuss - relayed information to her that one prescription has already been refilled but that future refills have been cancelled. Patient voiced confusion regarding this since she states she had relayed to Dr. Vigil that she had taken one of her remaining pills yesterday morning. Offered patient additional appointment to discuss with Dr. Vigil, patient declined at this time and states she will have to take some time to think about this prior to deciding. Patient verbalized understanding of all above information.

## 2023-03-20 ENCOUNTER — TELEPHONE (OUTPATIENT)
Dept: FAMILY MEDICINE | Facility: CLINIC | Age: 78
End: 2023-03-20
Payer: MEDICARE

## 2023-03-21 ENCOUNTER — TELEPHONE (OUTPATIENT)
Dept: FAMILY MEDICINE | Facility: CLINIC | Age: 78
End: 2023-03-21

## 2023-03-23 ENCOUNTER — TELEPHONE (OUTPATIENT)
Dept: FAMILY MEDICINE | Facility: CLINIC | Age: 78
End: 2023-03-23
Payer: MEDICARE

## 2023-03-23 NOTE — LETTER
March 23, 2023    Madeline L Tree  [address] [facility] [place] [address]             [address] [place] [address]  Phone: [phone]  Fax: [phone] Dear Mrs. Madeline Leavitt:    We are sorry that  your appointment with Belinda Vigil on 04/27/2023 has been canceled. We have attempted to call you x 3 to reschedule due to our time slots have changed for our providers. Your health and follow-up medical care are important to us. Please call our office as soon as possible so that we may reschedule your appointment. If you have already rescheduled your appointment, please disregard this letter.    Sincerely,        Tabatha Oh LPN

## 2023-03-24 ENCOUNTER — TELEPHONE (OUTPATIENT)
Dept: FAMILY MEDICINE | Facility: CLINIC | Age: 78
End: 2023-03-24
Payer: MEDICARE

## 2023-09-20 DIAGNOSIS — Z78.0 MENOPAUSE: ICD-10-CM

## (undated) DEVICE — GUIDEWIRE J TIP EXCHANGE FIXED CORE 260

## (undated) DEVICE — CATHETER RADIAL TIG 4.0 OPTITORQUE 5X110

## (undated) DEVICE — SHEATH INTRODUCER PRECISION ACCESS 6FX10

## (undated) DEVICE — CATHETER EXPO MLTPK 6FR 100X110CM

## (undated) DEVICE — HEMOSTAT VASC BAND REG 24CM

## (undated) DEVICE — SHEATH INTRODUCER SLENDER 6FX10CM